# Patient Record
Sex: MALE | Race: BLACK OR AFRICAN AMERICAN | NOT HISPANIC OR LATINO | Employment: FULL TIME | URBAN - METROPOLITAN AREA
[De-identification: names, ages, dates, MRNs, and addresses within clinical notes are randomized per-mention and may not be internally consistent; named-entity substitution may affect disease eponyms.]

---

## 2023-11-14 ASSESSMENT — DERMATOLOGY QUALITY OF LIFE (QOL) ASSESSMENT
DATE THE QUALITY-OF-LIFE ASSESSMENT WAS COMPLETED: 66792
RATE HOW EMOTIONALLY BOTHERED YOU ARE BY YOUR SKIN PROBLEM (FOR EXAMPLE, WORRY, EMBARRASSMENT, FRUSTRATION): 0 - NEVER BOTHERED
RATE HOW BOTHERED YOU ARE BY SYMPTOMS OF YOUR SKIN PROBLEM (EG, ITCHING, STINGING BURNING, HURTING OR SKIN IRRITATION): 0 - NEVER BOTHERED
RATE HOW BOTHERED YOU ARE BY EFFECTS OF YOUR SKIN PROBLEMS ON YOUR ACTIVITIES (EG, GOING OUT, ACCOMPLISHING WHAT YOU WANT, WORK ACTIVITIES OR YOUR RELATIONSHIPS WITH OTHERS): 0 - NEVER BOTHERED
RATE HOW BOTHERED YOU ARE BY SYMPTOMS OF YOUR SKIN PROBLEM (EG, ITCHING, STINGING BURNING, HURTING OR SKIN IRRITATION): 0 - NEVER BOTHERED
WHAT SINGLE SKIN CONDITION LISTED BELOW IS THE PATIENT ANSWERING THE QUALITY-OF-LIFE ASSESSMENT QUESTIONS ABOUT: NONE OF THE ABOVE
RATE HOW EMOTIONALLY BOTHERED YOU ARE BY YOUR SKIN PROBLEM (FOR EXAMPLE, WORRY, EMBARRASSMENT, FRUSTRATION): 0 - NEVER BOTHERED
WHAT SINGLE SKIN CONDITION LISTED BELOW IS THE PATIENT ANSWERING THE QUALITY-OF-LIFE ASSESSMENT QUESTIONS ABOUT: NONE OF THE ABOVE
RATE HOW BOTHERED YOU ARE BY EFFECTS OF YOUR SKIN PROBLEMS ON YOUR ACTIVITIES (EG, GOING OUT, ACCOMPLISHING WHAT YOU WANT, WORK ACTIVITIES OR YOUR RELATIONSHIPS WITH OTHERS): 0 - NEVER BOTHERED

## 2023-11-14 ASSESSMENT — PATIENT GLOBAL ASSESSMENT (PGA): WHAT IS THE PGA: PATIENT GLOBAL ASSESSMENT:  1 - CLEAR

## 2023-11-17 ENCOUNTER — OFFICE VISIT (OUTPATIENT)
Dept: DERMATOLOGY | Facility: CLINIC | Age: 48
End: 2023-11-17
Payer: MEDICARE

## 2023-11-17 DIAGNOSIS — A63.0 ANOGENITAL (VENEREAL) WARTS: Primary | ICD-10-CM

## 2023-11-17 DIAGNOSIS — L30.4 INTERTRIGO: ICD-10-CM

## 2023-11-17 PROCEDURE — 54056 CRYOSURGERY PENIS LESION(S): CPT | Performed by: DERMATOLOGY

## 2023-11-17 PROCEDURE — 99214 OFFICE O/P EST MOD 30 MIN: CPT | Performed by: DERMATOLOGY

## 2023-11-17 PROCEDURE — 17110 DESTRUCTION B9 LES UP TO 14: CPT

## 2023-11-17 RX ORDER — ATORVASTATIN CALCIUM 80 MG/1
80 TABLET, FILM COATED ORAL NIGHTLY
COMMUNITY
End: 2024-03-04 | Stop reason: SDUPTHER

## 2023-11-17 RX ORDER — ALLOPURINOL 100 MG/1
400 TABLET ORAL DAILY
COMMUNITY
End: 2024-03-04 | Stop reason: SDUPTHER

## 2023-11-17 RX ORDER — AMLODIPINE BESYLATE 10 MG/1
10 TABLET ORAL DAILY
COMMUNITY
End: 2024-03-04 | Stop reason: SDUPTHER

## 2023-11-17 RX ORDER — ATENOLOL 50 MG/1
50 TABLET ORAL 2 TIMES DAILY
COMMUNITY
End: 2024-03-04 | Stop reason: SDUPTHER

## 2023-11-17 RX ORDER — KETOCONAZOLE 20 MG/G
CREAM TOPICAL 2 TIMES DAILY
Qty: 60 G | Refills: 11 | Status: SHIPPED | OUTPATIENT
Start: 2023-11-17

## 2023-11-17 NOTE — PATIENT INSTRUCTIONS
Thank you for visiting with  Dermatology today!    At your visit today, you had lesions treated with cryotherapy.    Going forward, we suggest the following:  - Ketoconazole cream twice a day for 2-3 weeks  - Maintenance in the area with Zeasorb AF powder

## 2023-11-17 NOTE — PROGRESS NOTES
Subjective     Wing Fontanez is a 48 y.o. male who presents for the following: Skin Tag (In right groin area).  He reports the lesion in his right groin area has been present for several months.  It sometimes itches, and he also notes red, irritated patches in his groin folds as well.    Review of Systems:  No other skin or systemic complaints other than what is documented elsewhere in the note.    The following portions of the chart were reviewed this encounter and updated as appropriate:       Skin Cancer History  No skin cancer on file.    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 and is now working as an [employer]     Allergies:  Aspirin and Ace inhibitors    Current Medications / CAM's:    Current Outpatient Medications:     allopurinol (Zyloprim) 100 mg tablet, Take 4 tablets (400 mg) by mouth once daily., Disp: , Rfl:     atenolol (Tenormin) 50 mg tablet, Take 1 tablet (50 mg) by mouth 2 times a day., Disp: , Rfl:     atorvastatin (Lipitor) 80 mg tablet, Take 1 tablet (80 mg) by mouth once daily at bedtime., Disp: , Rfl:     amLODIPine (Norvasc) 10 mg tablet, Take 1 tablet (10 mg) by mouth once daily., Disp: , Rfl:     ketoconazole (NIZOral) 2 % cream, Apply topically 2 times a day. For 2-3 weeks., Disp: 60 g, Rfl: 11     Objective   Well appearing patient in no apparent distress; mood and affect are within normal limits.    A skin examination was performed including: Face, neck, and bilateral inguinal folds and external genitalia. All findings within normal limits unless otherwise noted below.    Assessment/Plan   1. Anogenital (venereal) warts (2)  Right inguinal crease (2)  In his right inguinal fold (1.5 cm) and on his right  lateral scrotum (8 mm), there are 2 similar-appearing flesh- to light brown-colored, hyperkeratotic, flat-topped, verrucous plaque and papule, respectively    Genital Warts - right inguinal fold and right lateral scrotum.  The viral nature of genital warts, their potentially communicable nature via skin-to-skin contact and sexual transmission, and treatment options were discussed with the patient today.  After discussing the risks and benefits of various treatment options, the patient expressed understanding and wishes to undergo treatment with liquid nitrogen cryotherapy in the office today.  Should any of the warts not resolve within 2-3 weeks following treatment today or any new or similar lesions are noticed in the future, the patient was instructed to call our office to schedule a return visit for re-evaluation and possible repeat or further treatment if indicated at that time.  The patient expressed understanding, is in agreement with this plan, and wishes to proceed with cryotherapy today.    Destr of lesion - Right inguinal crease  Complexity: simple    Destruction method: cryotherapy    Informed consent: discussed and consent obtained    Lesion destroyed using liquid nitrogen: Yes    Cryotherapy cycles:  2  Outcome: patient tolerated procedure well with no complications    Post-procedure details: wound care instructions given      ketoconazole (NIZOral) 2 % cream - Right inguinal crease  Apply topically 2 times a day. For 2-3 weeks.    2. Intertrigo  Right Medial Thigh  In the patient's bilateral inguinal folds, there are red, moist, slightly scaly patches located in skin folds.    Intertrigo - flare in bilateral inguinal folds.  The potentially chronic and intermittently flaring nature of this condition, which likely involves colonization with Candidal yeast, and treatment options were discussed extensively with the patient today.  At this time, we recommend topical anti-fungal therapy with Ketoconazole 2%  cream, which the patient was instructed to apply twice daily to the affected areas for the next 2-3 weeks, followed by taper to weekends only with use of over-the-counter Zeasorb AF powder once daily for maintenance; the patient may repeat treatment in a similar fashion as needed for future flares.  The risks, benefits, and side effects of these medications were discussed.  The patient expressed understanding and is in agreement with this plan.      I saw and evaluated the patient. I personally obtained the key and critical portions of the history and physical exam or was physically present for key and critical portions performed by the resident/fellow. I reviewed the resident/fellow's documentation and discussed the patient with the resident/fellow. I agree with the resident/fellow's medical decision making as documented in the note.    Uche Smith MD

## 2024-03-04 ENCOUNTER — TELEPHONE (OUTPATIENT)
Dept: PRIMARY CARE | Facility: HOSPITAL | Age: 49
End: 2024-03-04
Payer: MEDICARE

## 2024-03-04 DIAGNOSIS — I15.8 OTHER SECONDARY HYPERTENSION: Primary | ICD-10-CM

## 2024-03-04 DIAGNOSIS — M10.00 IDIOPATHIC GOUT, UNSPECIFIED CHRONICITY, UNSPECIFIED SITE: ICD-10-CM

## 2024-03-04 DIAGNOSIS — E78.2 MIXED HYPERLIPIDEMIA: ICD-10-CM

## 2024-03-04 RX ORDER — AMLODIPINE BESYLATE 10 MG/1
10 TABLET ORAL DAILY
Qty: 30 TABLET | Refills: 3 | Status: SHIPPED | OUTPATIENT
Start: 2024-03-04 | End: 2024-04-01 | Stop reason: SDUPTHER

## 2024-03-04 RX ORDER — ALLOPURINOL 100 MG/1
400 TABLET ORAL DAILY
Qty: 30 TABLET | Refills: 3 | Status: SHIPPED | OUTPATIENT
Start: 2024-03-04 | End: 2024-04-01 | Stop reason: SDUPTHER

## 2024-03-04 RX ORDER — ATORVASTATIN CALCIUM 80 MG/1
80 TABLET, FILM COATED ORAL NIGHTLY
Qty: 30 TABLET | Refills: 3 | Status: SHIPPED | OUTPATIENT
Start: 2024-03-04 | End: 2024-04-01 | Stop reason: SDUPTHER

## 2024-03-04 RX ORDER — ATENOLOL 50 MG/1
50 TABLET ORAL 2 TIMES DAILY
Qty: 60 TABLET | Refills: 3 | Status: SHIPPED | OUTPATIENT
Start: 2024-03-04 | End: 2024-04-01 | Stop reason: SDUPTHER

## 2024-03-04 NOTE — TELEPHONE ENCOUNTER
Patient has an appt on 3/7/24 can we send RX for 7 days for    Allopurinol 100mg    Amlodopine 10mg    Atenolol 50mg    Atorvastatin 80mg    [facility] in [place]

## 2024-03-07 ENCOUNTER — APPOINTMENT (OUTPATIENT)
Dept: PRIMARY CARE | Facility: HOSPITAL | Age: 49
End: 2024-03-07
Payer: MEDICARE

## 2024-04-01 ENCOUNTER — TELEPHONE (OUTPATIENT)
Dept: PRIMARY CARE | Facility: HOSPITAL | Age: 49
End: 2024-04-01
Payer: MEDICARE

## 2024-04-01 DIAGNOSIS — E78.2 MIXED HYPERLIPIDEMIA: ICD-10-CM

## 2024-04-01 DIAGNOSIS — I15.8 OTHER SECONDARY HYPERTENSION: ICD-10-CM

## 2024-04-01 DIAGNOSIS — M10.00 IDIOPATHIC GOUT, UNSPECIFIED CHRONICITY, UNSPECIFIED SITE: ICD-10-CM

## 2024-04-01 RX ORDER — AMLODIPINE BESYLATE 10 MG/1
10 TABLET ORAL DAILY
Qty: 30 TABLET | Refills: 0 | Status: SHIPPED | OUTPATIENT
Start: 2024-04-01 | End: 2024-05-01

## 2024-04-01 RX ORDER — ATORVASTATIN CALCIUM 80 MG/1
80 TABLET, FILM COATED ORAL NIGHTLY
Qty: 30 TABLET | Refills: 0 | Status: SHIPPED | OUTPATIENT
Start: 2024-04-01 | End: 2024-05-01

## 2024-04-01 RX ORDER — ATENOLOL 50 MG/1
50 TABLET ORAL 2 TIMES DAILY
Qty: 60 TABLET | Refills: 0 | Status: SHIPPED | OUTPATIENT
Start: 2024-04-01 | End: 2024-05-01

## 2024-04-01 RX ORDER — ALLOPURINOL 100 MG/1
400 TABLET ORAL DAILY
Qty: 120 TABLET | Refills: 0 | Status: SHIPPED | OUTPATIENT
Start: 2024-04-01 | End: 2024-05-01

## 2024-04-01 NOTE — TELEPHONE ENCOUNTER
Received a box task regarding RX refills. Given that it has been atleast a year since we saw him last (med refills were last sent April 2023; but pt states that he was seen by us in September 2023) and he has an appt in 2 days with us, I called him back letting him know about his appt and that we could discuss his meds then. Pt states that he has a new job and so is unsure about his insurance and does not know if he can attend his appt with us on 4/3/24. I gave him our clinic # to call and discuss this insurance issue further with more details. I gave him a month's worth of refills on his allopurinol, amlodipine, atenolol, and atorvastatin to give him time to get this issue sorted.

## 2024-04-01 NOTE — TELEPHONE ENCOUNTER
Patient requesting refills on all medications.    Allopurinol  Amlodopine  Atenolol  Atorvastin    Health system pharmacy in [place]. Patient can be reached at [phone] patient states he was last seen 9/2023 unable to verify in the system

## 2024-04-03 ENCOUNTER — APPOINTMENT (OUTPATIENT)
Dept: PRIMARY CARE | Facility: HOSPITAL | Age: 49
End: 2024-04-03
Payer: MEDICARE

## 2024-06-24 DIAGNOSIS — I15.8 OTHER SECONDARY HYPERTENSION: ICD-10-CM

## 2024-06-26 ENCOUNTER — TELEPHONE (OUTPATIENT)
Dept: PRIMARY CARE | Facility: HOSPITAL | Age: 49
End: 2024-06-26
Payer: MEDICARE

## 2024-06-26 RX ORDER — ATENOLOL 50 MG/1
50 TABLET ORAL 2 TIMES DAILY
Qty: 60 TABLET | Refills: 0 | Status: SHIPPED | OUTPATIENT
Start: 2024-06-26 | End: 2024-07-26

## 2024-06-26 RX ORDER — AMLODIPINE BESYLATE 10 MG/1
10 TABLET ORAL DAILY
Qty: 30 TABLET | Refills: 0 | Status: SHIPPED | OUTPATIENT
Start: 2024-06-26 | End: 2024-07-26

## 2024-07-16 ENCOUNTER — OFFICE VISIT (OUTPATIENT)
Dept: PRIMARY CARE | Facility: HOSPITAL | Age: 49
End: 2024-07-16
Payer: MEDICARE

## 2024-07-16 ENCOUNTER — LAB (OUTPATIENT)
Dept: LAB | Facility: LAB | Age: 49
End: 2024-07-16
Payer: MEDICARE

## 2024-07-16 VITALS
HEIGHT: 66 IN | SYSTOLIC BLOOD PRESSURE: 119 MMHG | HEART RATE: 51 BPM | OXYGEN SATURATION: 97 % | TEMPERATURE: 97.9 F | BODY MASS INDEX: 32.14 KG/M2 | DIASTOLIC BLOOD PRESSURE: 74 MMHG | WEIGHT: 200 LBS

## 2024-07-16 DIAGNOSIS — E78.2 MIXED HYPERLIPIDEMIA: ICD-10-CM

## 2024-07-16 DIAGNOSIS — R73.03 PRE-DIABETES: ICD-10-CM

## 2024-07-16 DIAGNOSIS — F17.200 TOBACCO USE DISORDER: ICD-10-CM

## 2024-07-16 DIAGNOSIS — E78.00 PURE HYPERCHOLESTEROLEMIA: ICD-10-CM

## 2024-07-16 DIAGNOSIS — M67.40 GANGLION CYST: ICD-10-CM

## 2024-07-16 DIAGNOSIS — I15.8 OTHER SECONDARY HYPERTENSION: ICD-10-CM

## 2024-07-16 DIAGNOSIS — Z00.00 HEALTHCARE MAINTENANCE: Primary | ICD-10-CM

## 2024-07-16 DIAGNOSIS — M10.00 IDIOPATHIC GOUT, UNSPECIFIED CHRONICITY, UNSPECIFIED SITE: ICD-10-CM

## 2024-07-16 LAB
CHOLEST SERPL-MCNC: 284 MG/DL (ref 0–199)
CHOLESTEROL/HDL RATIO: 5.1
EST. AVERAGE GLUCOSE BLD GHB EST-MCNC: 117 MG/DL
HBA1C MFR BLD: 5.7 %
HDLC SERPL-MCNC: 55.9 MG/DL
LDLC SERPL CALC-MCNC: ABNORMAL MG/DL
NON HDL CHOLESTEROL: 228 MG/DL (ref 0–149)
TRIGL SERPL-MCNC: 432 MG/DL (ref 0–149)
VLDL: ABNORMAL

## 2024-07-16 PROCEDURE — 3078F DIAST BP <80 MM HG: CPT

## 2024-07-16 PROCEDURE — 99396 PREV VISIT EST AGE 40-64: CPT

## 2024-07-16 PROCEDURE — 83036 HEMOGLOBIN GLYCOSYLATED A1C: CPT

## 2024-07-16 PROCEDURE — 80061 LIPID PANEL: CPT

## 2024-07-16 PROCEDURE — 36415 COLL VENOUS BLD VENIPUNCTURE: CPT

## 2024-07-16 PROCEDURE — 3074F SYST BP LT 130 MM HG: CPT

## 2024-07-16 PROCEDURE — 3008F BODY MASS INDEX DOCD: CPT

## 2024-07-16 RX ORDER — ATENOLOL 50 MG/1
50 TABLET ORAL 2 TIMES DAILY
Qty: 60 TABLET | Refills: 11 | Status: SHIPPED | OUTPATIENT
Start: 2024-07-16 | End: 2025-07-11

## 2024-07-16 RX ORDER — ALLOPURINOL 100 MG/1
200 TABLET ORAL DAILY
Qty: 30 TABLET | Refills: 11 | Status: SHIPPED | OUTPATIENT
Start: 2024-07-16

## 2024-07-16 RX ORDER — ATORVASTATIN CALCIUM 80 MG/1
80 TABLET, FILM COATED ORAL NIGHTLY
Qty: 30 TABLET | Refills: 11 | Status: SHIPPED | OUTPATIENT
Start: 2024-07-16 | End: 2025-07-11

## 2024-07-16 RX ORDER — AMLODIPINE BESYLATE 10 MG/1
10 TABLET ORAL DAILY
Qty: 30 TABLET | Refills: 11 | Status: SHIPPED | OUTPATIENT
Start: 2024-07-16 | End: 2025-07-11

## 2024-07-16 RX ORDER — VARENICLINE TARTRATE 1 MG/1
1 TABLET, FILM COATED ORAL DAILY
Qty: 60 TABLET | Refills: 2 | Status: SHIPPED | OUTPATIENT
Start: 2024-07-16 | End: 2025-03-13

## 2024-07-16 RX ORDER — ALLOPURINOL 100 MG/1
200 TABLET ORAL DAILY
COMMUNITY
End: 2024-07-16 | Stop reason: SDUPTHER

## 2024-07-16 ASSESSMENT — ENCOUNTER SYMPTOMS
OCCASIONAL FEELINGS OF UNSTEADINESS: 0
LOSS OF SENSATION IN FEET: 0
DEPRESSION: 0

## 2024-07-16 ASSESSMENT — PATIENT HEALTH QUESTIONNAIRE - PHQ9
2. FEELING DOWN, DEPRESSED OR HOPELESS: NOT AT ALL
SUM OF ALL RESPONSES TO PHQ9 QUESTIONS 1 AND 2: 0
1. LITTLE INTEREST OR PLEASURE IN DOING THINGS: NOT AT ALL

## 2024-07-16 ASSESSMENT — PAIN SCALES - GENERAL: PAINLEVEL: 0-NO PAIN

## 2024-07-16 NOTE — PROGRESS NOTES
Subjective   Patient ID: Wing Fontanez is a 49 y.o. male who presents for Annual Exam.  HPI    Wing Fontanez 49 yr M follow up 3 PM  11/2023 - saw derm for anogenital warts with cryotherapy applied and interigo with ketoconazole applied, resolved    Ganglion on L and R wrist, non painful, not larger no pain, no ROM issues  Dr Pineda Cage did Carpal tunnel procedure (hand), would like referral back to him    4/2023 - saw sleep med - on CPAP, interested in oral appliance due to frequent travel, going ok compliant    No recent gout flares, would like to continue on amlodipine    Last seen [facility] [place], [place] 12/2022 - quit smoking for a year  No family hx colon cancer, would like to go for screening no Fhx colon cancer    Smoking here and there, smokes a pack every 4 to 5, wants to quit, was on chantix, off for couple, wants Chantix    Interested in weight loss, wants to change habits, likes greens, limited fast food    Meds: atorvastatin 80, amlodipine 10 mg, atenolol 50 mg, allopurinol 400 mg every day  Ran out of atorvastatin          Review of Systems  Otherwise neg except as mentioned above    Past Medical History:   Diagnosis Date    Other specified health status     No pertinent past medical history    Personal history of other diseases of urinary system 08/22/2017    History of chronic kidney disease     No past surgical history on file.  No family history on file.  Medication Documentation Review Audit       Reviewed by Maira Villavicencio MA (Medical Assistant) on 07/16/24 at 1526      Medication Order Taking? Sig Documenting Provider Last Dose Status   amLODIPine (Norvasc) 10 mg tablet [id number]  Take 1 tablet (10 mg) by mouth once daily. Benny Marquis MD MPH  Active   atenolol (Tenormin) 50 mg tablet [id number]  Take 1 tablet (50 mg) by mouth 2 times a day. Benny Marquis MD MPH  Active   atorvastatin (Lipitor) 80 mg tablet [id number]  Take 1 tablet (80 mg) by mouth once daily at bedtime. Harika Casarez MD   Active   ketoconazole (NIZOral) 2 % cream [id number]  Apply topically 2 times a day. For 2-3 weeks. Suly Perry MD  Active                  Allergies   Allergen Reactions    Aspirin Anaphylaxis and Other    Ace Inhibitors Angioedema     Reaction Date:Approx 19Feb2016     Social Determinants of Health     Tobacco Use: High Risk (7/16/2024)    Patient History     Smoking Tobacco Use: Some Days     Smokeless Tobacco Use: Never     Passive Exposure: Not on file   Alcohol Use: Not on file   Financial Resource Strain: Not on file   Food Insecurity: Not on file   Transportation Needs: Not on file   Physical Activity: Not on file   Stress: Not on file   Social Connections: Not on file   Intimate Partner Violence: Not on file   Depression: Not at risk (7/16/2024)    PHQ-2     PHQ-2 Score: 0   Housing Stability: Not on file   Utilities: Not on file   Digital Equity: Not on file   Health Literacy: Not on file       Objective   Visit Vitals  /74   Pulse 51   Temp 36.6 °C (97.9 °F) (Temporal)      Physical Exam      Gen: NAD, alert and oriented, resting comfortably on exam table  HEENT: PERRL, EOMI, no oral lesions, MMM, TM intact b/l  Neck: supple, no masses  Cardiac: RRR. No murmurs, rubs, or gallops  Resp: CTA b/l, no rales or ronchi  Abd: soft, nontender to palpation, no HSM, normal bowel sounds  MSK: Strength and ROM grossly intact b/l, b/l wrist ganglion cyst 2 cm  Neuro: CN 2-12 intact b/l, sensation to light touch intact  Skin: No rash or bruising  Lymph: No edema  Vascular: Radial and pedal pulses 2+ b/l  Psych: Normal mood and affect    No results found for this or any previous visit (from the past 24 hour(s)).       Assessment/Plan            Mr. Fontanez is a 50 y/o man with PMH of HTN, gout, obesity, and tobacco use disorder who presents today for routine check up. Overall doing well, will refill meds and address health maintenance issues. Labs today lipid and A1c.     Active  issues:  #HLD  #Hypertriglyceridemia  - counseled patient on elevated triglycerides and effect of fatty foods; encouraged to cut back on fatty foods  -last lipids 3/2022 - T chol 170, HDL 48, LDL 86,   -Recheck ordered, restarted home atorvastatin 80 mg every day (had been off since 2023     #HTN  - Continue on amlodipine 10 mg, atenolol 50 mg daily. (hx of angioedema w/ ACE-I, MAYO w/ thiazide)  - Check BP QD using home BP cuff  - Daily exercise, DASH diet encouraged     #Pre-diabetes  -HbA1c 5.8 (3/2022)  -Continue diet and exercise control  -Recheck ordered, no current meds    #B/l wrist ganglion cyst  -Currently asymptomatic, referral to Ortho hand for evaluation for possible aspiration vs conservative management.      #Gout  -Uric aid: 7.7 (3/2022)  -Cont allopurinol 200mg every day    #Obesity  -BMI 32, counseled on lifestyle changes     #Sleep Apnea  - Followed by Sleep Med, last seen 8/2020  - On CPAP machine     #Tobacco Use Disorder  - Smoking cessation discussed and encouraged  - Patient was able to quit for a whole year but now is smoking again and wants to quit  -restarting Chantix:  Days 1 to 3: 0.5 mg once daily.  Days 4 to 7: 0.5 mg twice daily.  Maintenance (day 8 and later): 1 mg twice daily  -tobacco counseling ordered    #Health maintenance   - HIV: negative in 2011  - Colonoscopy ordered today  - Pneumovax PPV23 5/2017, PCV 11/2023, Tdap 1/2020, COVID x2 (5/2021)     Staffed with: Dr. Torrez  Follow up: 3 months    Signed,  Latoya York MD PGY-3  Internal Medicine-Pediatrics

## 2024-07-16 NOTE — PATIENT INSTRUCTIONS
Dear  Mr Fontanez,    Thank you for choosing [facility] for your care needs. It was a pleasure to serve you. Today, we discussed the following:    Cholesterol and diabetes screening today    Ordered colonoscopy for colon cancer svcreening    [facility]  New prescription for Chantix to help with smoking cessation. Tobacco counseling    Days 1 to 3: 0.5 mg once daily.    Days 4 to 7: 0.5 mg twice daily.    Maintenance (day 8 and later): 1 mg twice daily      Orthopedics Dr Pineda Cage for ganglion cyst      Referrals:  you were referred to see the following specialists: orthopedics You should receive a call from central scheduling in the next few days if you do not receive a call within 3-5 business days please call [phone] to schedule your appointment. You can also schedule on the [employer] shanice for smartphones.     Health maintenance: up to date on pneumonia shot      Follow up:      Thank you so much for coming to the clinic today. It was very nice to meet you. If you have any questions please call our office at [phone] to talk to one of our physicians or schedule an appointment. Our fax number is [phone]. If your issue cannot wait until the next business day, please go to urgent care or the emergency department.     Latoya York MD  Internal Medicine/Pediatrics, PGY-2 resident physician      If You smoke or use other tobacco products, take steps to quit. Call [phone] for more information or to set up an appointment with  Tobacco Treatment & Counseling Program. The [place] Tobacco Quit Line is a free resource for people who don’t have insurance, receive Medicaid, pregnant women, or members of the [place] Tobacco Collaborative. Call 1-318-QUIT-NOW or 1-367.286.7947.    I also strongly urge all of my patients to register for [employer] by going to: [email or web address]  (The  staff can also send you a text/email link to register when you check out).

## 2024-07-17 NOTE — PROGRESS NOTES
I reviewed the resident/fellow's documentation and discussed the patient with the resident/fellow. I agree with the resident/fellow's medical decision making as documented in the note.    ATTENDING TEMPLATE [facility]  NAME: Erich Fontanez here for followup.  He is motivated to quit smoking again.  Wants Chantix.    HPI:  49 year old male   PMX:  HTN, tobacco use disorder prediabetic,  ROMEL  MEDS:  amlodipine, atenolol, atorvastatin ran out, allopurinol  Allergies:  angioedema on ACE, MAYO on thiazide  SOC HX: +tobacco  FAM HX:    PE:  BMI=32,  bilateral cysts on his wrists,    RECOMMEND:  1.  Tobacco cessation counseling and Chantix  2.  BP good today  3.  Encourage healthy habits  4.  Screening and prevention addressed    Carmen Torrez MD

## 2024-07-20 ENCOUNTER — TELEPHONE (OUTPATIENT)
Dept: INTERNAL MEDICINE | Facility: HOSPITAL | Age: 49
End: 2024-07-20
Payer: MEDICARE

## 2024-07-20 DIAGNOSIS — E78.1 HYPERTRIGLYCERIDEMIA: Primary | ICD-10-CM

## 2024-07-20 NOTE — TELEPHONE ENCOUNTER
Labs came back for patient A1c level improving by 0.1%, still prediabetic. On lipid, nonfasting triglyceride >400 precluding LDL calculation. Patient has been off Atorvastatin and recently resumed. Will order fasting lipid panel to assess LDL and TGY accurately. Called patient and no answer so will send my chart message    Signed,  Latoya York MD PGY-2  Internal Medicine-Pediatrics

## 2024-10-09 ENCOUNTER — HOSPITAL ENCOUNTER (OUTPATIENT)
Dept: GASTROENTEROLOGY | Facility: HOSPITAL | Age: 49
Setting detail: OUTPATIENT SURGERY
Discharge: HOME | End: 2024-10-09
Payer: MEDICARE

## 2024-10-09 VITALS
RESPIRATION RATE: 16 BRPM | DIASTOLIC BLOOD PRESSURE: 73 MMHG | SYSTOLIC BLOOD PRESSURE: 114 MMHG | HEART RATE: 54 BPM | TEMPERATURE: 98.2 F | OXYGEN SATURATION: 97 %

## 2024-10-09 DIAGNOSIS — Z00.00 HEALTHCARE MAINTENANCE: ICD-10-CM

## 2024-10-09 PROCEDURE — 45378 DIAGNOSTIC COLONOSCOPY: CPT | Performed by: STUDENT IN AN ORGANIZED HEALTH CARE EDUCATION/TRAINING PROGRAM

## 2024-10-09 PROCEDURE — 7100000009 HC PHASE TWO TIME - INITIAL BASE CHARGE

## 2024-10-09 PROCEDURE — 2500000004 HC RX 250 GENERAL PHARMACY W/ HCPCS (ALT 636 FOR OP/ED): Performed by: STUDENT IN AN ORGANIZED HEALTH CARE EDUCATION/TRAINING PROGRAM

## 2024-10-09 PROCEDURE — 99152 MOD SED SAME PHYS/QHP 5/>YRS: CPT | Performed by: STUDENT IN AN ORGANIZED HEALTH CARE EDUCATION/TRAINING PROGRAM

## 2024-10-09 PROCEDURE — 7100000010 HC PHASE TWO TIME - EACH INCREMENTAL 1 MINUTE

## 2024-10-09 PROCEDURE — 3700000012 HC SEDATION LEVEL 5+ TIME - INITIAL 15 MINUTES 5/> YEARS

## 2024-10-09 PROCEDURE — G0121 COLON CA SCRN NOT HI RSK IND: HCPCS | Performed by: STUDENT IN AN ORGANIZED HEALTH CARE EDUCATION/TRAINING PROGRAM

## 2024-10-09 RX ORDER — FENTANYL CITRATE 50 UG/ML
INJECTION, SOLUTION INTRAMUSCULAR; INTRAVENOUS AS NEEDED
Status: COMPLETED | OUTPATIENT
Start: 2024-10-09 | End: 2024-10-09

## 2024-10-09 RX ORDER — MIDAZOLAM HYDROCHLORIDE 1 MG/ML
INJECTION, SOLUTION INTRAMUSCULAR; INTRAVENOUS AS NEEDED
Status: COMPLETED | OUTPATIENT
Start: 2024-10-09 | End: 2024-10-09

## 2024-10-09 ASSESSMENT — PAIN SCALES - GENERAL
PAINLEVEL_OUTOF10: 0 - NO PAIN

## 2024-10-09 ASSESSMENT — PAIN - FUNCTIONAL ASSESSMENT
PAIN_FUNCTIONAL_ASSESSMENT: 0-10

## 2024-10-09 NOTE — H&P
History Of Present Illness  Wing Fontanez is a 49 y.o. male presenting for his screening colonoscopy     Past Medical History  Past Medical History:   Diagnosis Date    Other specified health status     No pertinent past medical history    Personal history of other diseases of urinary system 08/22/2017    History of chronic kidney disease     Surgical History  No past surgical history on file.  Social History  He reports that he has been smoking cigars. He has never used smokeless tobacco. He reports current alcohol use. He reports that he does not use drugs.    Family History  No family history on file.     Allergies  Allergies   Allergen Reactions    Aspirin Anaphylaxis and Other    Ace Inhibitors Angioedema     Reaction Date:Approx 19Feb2016     Review of Systems   All other systems reviewed and are negative.    Pre-sedation Evaluation:  ASA Classification - ASA 3 - Patient with moderate systemic disease with functional limitations  Mallampati Score - II (hard and soft palate, upper portion of tonsils and uvula visible)    Physical Exam  Vitals reviewed.   Eyes:      General: No scleral icterus.     Pupils: Pupils are equal, round, and reactive to light.   Cardiovascular:      Rate and Rhythm: Normal rate and regular rhythm.      Pulses: Normal pulses.   Pulmonary:      Effort: Pulmonary effort is normal.   Abdominal:      General: Abdomen is flat. There is no distension.      Palpations: Abdomen is soft.      Tenderness: There is no abdominal tenderness.   Skin:     General: Skin is warm.      Capillary Refill: Capillary refill takes less than 2 seconds.   Neurological:      General: No focal deficit present.      Mental Status: He is alert and oriented to person, place, and time.   Psychiatric:         Mood and Affect: Mood normal.         Thought Content: Thought content normal.         Judgment: Judgment normal.          Last Recorded Vitals  There were no vitals taken for this visit.     Assessment/Plan    Will proceed with screening colonoscopy      PTA/Current Medications:  (Not in a hospital admission)    Current Outpatient Medications   Medication Sig Dispense Refill    allopurinol (Zyloprim) 100 mg tablet Take 2 tablets (200 mg) by mouth once daily. 30 tablet 11    amLODIPine (Norvasc) 10 mg tablet Take 1 tablet (10 mg) by mouth once daily. 30 tablet 11    atenolol (Tenormin) 50 mg tablet Take 1 tablet (50 mg) by mouth 2 times a day. 60 tablet 11    atorvastatin (Lipitor) 80 mg tablet Take 1 tablet (80 mg) by mouth once daily at bedtime. 30 tablet 11    varenicline (Chantix) 1 mg tablet Take 1 tablet (1 mg) by mouth once daily. Take with full glass of water. Days 1 to 3: 0.5 mg once daily. Days 4 to 7: 0.5 mg twice daily.Maintenance (day 8 and later): 1 mg twice daily 60 tablet 2     No current facility-administered medications for this encounter.     Abebe Garcia MD

## 2024-10-18 DIAGNOSIS — F17.200 TOBACCO USE DISORDER: ICD-10-CM

## 2024-10-18 RX ORDER — VARENICLINE TARTRATE 1 MG/1
1 TABLET, FILM COATED ORAL DAILY
Qty: 60 TABLET | Refills: 2 | Status: SHIPPED | OUTPATIENT
Start: 2024-10-18 | End: 2025-06-15

## 2024-10-23 ENCOUNTER — TELEPHONE (OUTPATIENT)
Dept: PEDIATRICS | Facility: CLINIC | Age: 49
End: 2024-10-23
Payer: MEDICARE

## 2024-10-23 NOTE — TELEPHONE ENCOUNTER
Colonoscopy came back. No polyps  Repeat 10 years    Called pt and went to voicemail so left HIPAA compliant message.    Sent [employer] message with results and to encourage to schedule follow up    Signed,  Latoya York MD PGY-3  Internal Medicine-Pediatrics

## 2024-10-28 ENCOUNTER — APPOINTMENT (OUTPATIENT)
Dept: PRIMARY CARE | Facility: HOSPITAL | Age: 49
End: 2024-10-28
Payer: MEDICARE

## 2024-10-28 ENCOUNTER — TELEPHONE (OUTPATIENT)
Dept: ENDOCRINOLOGY | Facility: HOSPITAL | Age: 49
End: 2024-10-28
Payer: MEDICARE

## 2024-11-05 ENCOUNTER — DOCUMENTATION (OUTPATIENT)
Dept: PRIMARY CARE | Facility: HOSPITAL | Age: 49
End: 2024-11-05
Payer: MEDICARE

## 2024-11-05 ENCOUNTER — OFFICE VISIT (OUTPATIENT)
Dept: PRIMARY CARE | Facility: HOSPITAL | Age: 49
End: 2024-11-05
Payer: MEDICARE

## 2024-11-05 VITALS
DIASTOLIC BLOOD PRESSURE: 80 MMHG | TEMPERATURE: 98.2 F | HEIGHT: 66 IN | OXYGEN SATURATION: 97 % | HEART RATE: 55 BPM | SYSTOLIC BLOOD PRESSURE: 135 MMHG | WEIGHT: 197 LBS | BODY MASS INDEX: 31.66 KG/M2

## 2024-11-05 DIAGNOSIS — I10 PRIMARY HYPERTENSION: ICD-10-CM

## 2024-11-05 DIAGNOSIS — E78.2 MIXED HYPERLIPIDEMIA: ICD-10-CM

## 2024-11-05 DIAGNOSIS — F17.200 TOBACCO USE DISORDER: ICD-10-CM

## 2024-11-05 DIAGNOSIS — R73.03 PREDIABETES: Primary | ICD-10-CM

## 2024-11-05 DIAGNOSIS — M10.00 IDIOPATHIC GOUT, UNSPECIFIED CHRONICITY, UNSPECIFIED SITE: ICD-10-CM

## 2024-11-05 DIAGNOSIS — I15.8 OTHER SECONDARY HYPERTENSION: ICD-10-CM

## 2024-11-05 LAB
ALBUMIN SERPL BCP-MCNC: 4.9 G/DL (ref 3.4–5)
ANION GAP SERPL CALC-SCNC: 14 MMOL/L (ref 10–20)
BUN SERPL-MCNC: 12 MG/DL (ref 6–23)
CALCIUM SERPL-MCNC: 9.7 MG/DL (ref 8.6–10.6)
CHLORIDE SERPL-SCNC: 104 MMOL/L (ref 98–107)
CHOLEST SERPL-MCNC: 151 MG/DL (ref 0–199)
CHOLESTEROL/HDL RATIO: 3.2
CO2 SERPL-SCNC: 25 MMOL/L (ref 21–32)
CREAT SERPL-MCNC: 0.94 MG/DL (ref 0.5–1.3)
EGFRCR SERPLBLD CKD-EPI 2021: >90 ML/MIN/1.73M*2
EST. AVERAGE GLUCOSE BLD GHB EST-MCNC: 128 MG/DL
GLUCOSE SERPL-MCNC: 95 MG/DL (ref 74–99)
HBA1C MFR BLD: 6.1 %
HDLC SERPL-MCNC: 47.4 MG/DL
LDLC SERPL CALC-MCNC: 54 MG/DL
NON HDL CHOLESTEROL: 104 MG/DL (ref 0–149)
PHOSPHATE SERPL-MCNC: 3.6 MG/DL (ref 2.5–4.9)
POTASSIUM SERPL-SCNC: 6.1 MMOL/L (ref 3.5–5.3)
SODIUM SERPL-SCNC: 137 MMOL/L (ref 136–145)
TRIGL SERPL-MCNC: 249 MG/DL (ref 0–149)
VLDL: 50 MG/DL (ref 0–40)

## 2024-11-05 PROCEDURE — 99212 OFFICE O/P EST SF 10 MIN: CPT

## 2024-11-05 PROCEDURE — 99212 OFFICE O/P EST SF 10 MIN: CPT | Mod: GC

## 2024-11-05 PROCEDURE — 99000 SPECIMEN HANDLING OFFICE-LAB: CPT

## 2024-11-05 PROCEDURE — 80069 RENAL FUNCTION PANEL: CPT

## 2024-11-05 PROCEDURE — 3075F SYST BP GE 130 - 139MM HG: CPT

## 2024-11-05 PROCEDURE — 3008F BODY MASS INDEX DOCD: CPT

## 2024-11-05 PROCEDURE — 3079F DIAST BP 80-89 MM HG: CPT

## 2024-11-05 PROCEDURE — 80061 LIPID PANEL: CPT

## 2024-11-05 PROCEDURE — 83036 HEMOGLOBIN GLYCOSYLATED A1C: CPT

## 2024-11-05 PROCEDURE — 36415 COLL VENOUS BLD VENIPUNCTURE: CPT

## 2024-11-05 PROCEDURE — 90656 IIV3 VACC NO PRSV 0.5 ML IM: CPT | Mod: GC

## 2024-11-05 RX ORDER — AMLODIPINE BESYLATE 10 MG/1
10 TABLET ORAL DAILY
Qty: 30 TABLET | Refills: 11 | Status: SHIPPED | OUTPATIENT
Start: 2024-11-05 | End: 2025-10-31

## 2024-11-05 RX ORDER — VARENICLINE TARTRATE 1 MG/1
1 TABLET, FILM COATED ORAL 2 TIMES DAILY
Qty: 180 TABLET | Refills: 2 | Status: SHIPPED | OUTPATIENT
Start: 2024-11-05 | End: 2025-08-02

## 2024-11-05 RX ORDER — ATORVASTATIN CALCIUM 80 MG/1
80 TABLET, FILM COATED ORAL NIGHTLY
Qty: 30 TABLET | Refills: 11 | Status: SHIPPED | OUTPATIENT
Start: 2024-11-05 | End: 2025-10-31

## 2024-11-05 RX ORDER — ALLOPURINOL 100 MG/1
200 TABLET ORAL DAILY
Qty: 30 TABLET | Refills: 11 | Status: SHIPPED | OUTPATIENT
Start: 2024-11-05

## 2024-11-05 RX ORDER — ATENOLOL 50 MG/1
50 TABLET ORAL 2 TIMES DAILY
Qty: 60 TABLET | Refills: 11 | Status: SHIPPED | OUTPATIENT
Start: 2024-11-05 | End: 2025-10-31

## 2024-11-05 ASSESSMENT — PAIN SCALES - GENERAL: PAINLEVEL_OUTOF10: 0-NO PAIN

## 2024-11-05 ASSESSMENT — PATIENT HEALTH QUESTIONNAIRE - PHQ9
2. FEELING DOWN, DEPRESSED OR HOPELESS: NOT AT ALL
1. LITTLE INTEREST OR PLEASURE IN DOING THINGS: NOT AT ALL
SUM OF ALL RESPONSES TO PHQ9 QUESTIONS 1 AND 2: 0

## 2024-11-05 ASSESSMENT — ENCOUNTER SYMPTOMS
LOSS OF SENSATION IN FEET: 0
DEPRESSION: 0
OCCASIONAL FEELINGS OF UNSTEADINESS: 0

## 2024-11-05 NOTE — PROGRESS NOTES
I saw and evaluated the patient. I personally obtained the key and critical portions of the history and physical exam or was physically present for key and critical portions performed by the resident/fellow. I reviewed the resident/fellow's documentation and discussed the patient with the resident/fellow. I agree with the resident/fellow's medical decision making as documented in the note.    Rachel Marsahll MD

## 2024-11-05 NOTE — PATIENT INSTRUCTIONS
Dear  Mr Fontanez,    Thank you for choosing [facility] for your care needs. It was a pleasure to serve you. Today, we discussed the following:    Labs today for cholesterol, lipid screen, renal function, will call with results    Blood pressure - please take three times per week and send results to me on my chart and then we can decide if need to add additional med    Keep up good work with stopped smoking, Chantix refill maintenance dose!    Refills on meds    Follow up with sleep med for CPAP    Ozempic unlikely covered by insurance, handout given, will order test script to [facility] pharmacy once your labs result.      Health maintenance:  Please get your annual flu shot (done today), please gte covid shot from local pharmacy    Follow up: 6 month follow up    Thank you so much for coming to the clinic today. It was very nice to meet you. If you have any questions please call our office at [phone] to talk to one of our physicians or schedule an appointment. Our fax number is [phone]. If your issue cannot wait until the next business day, please go to urgent care or the emergency department.     Latoya York MD  Internal Medicine/Pediatrics, PGY-2 resident physician      If You smoke or use other tobacco products, take steps to quit. Call [phone] for more information or to set up an appointment with  Tobacco Treatment & Counseling Program. The [place] Tobacco Quit Line is a free resource for people who don’t have insurance, receive Medicaid, pregnant women, or members of the [place] Collabor[facility]. Call 0-789-QUIT-NOW or 1-978.614.3513.    I also strongly urge all of my patients to register for [facility]or[facility]art by going to: [email or web address]  (The  staff can also send you a text/email link to register when you check out).

## 2024-11-05 NOTE — PROGRESS NOTES
Advanced care planning discussed at this visit.  Patient does not currently have a Healthcare Power of  or Living Will in place. He does express that his friend Ora Hodges has his permission to act as his surrogate decision maker in the event of an emergency. Patient advised to bring in POA, Living Will and Advanced Care Plan for his chart if he  obtains one.  Patient was given information on POA and Living Will as well as documents to complete each when they wish.

## 2024-11-05 NOTE — PROGRESS NOTES
Subjective   Patient ID: Wing Fontanez is a 49 y.o. male who presents for Follow-up.  HPI    Wing Fontanez 49 yr M - follow up    Has been doing well  Wants diabetes recheck  Diet is better, not a lot of fast food, likes salads, occasional juice, rare pop, water  Exercise - gotten away from it, planning on restarting, did have [employer] membership, one day cardio/2 days working out  Interested in Ozempic for health benefits, has seen commericial on TV, OK giving self injection, no hx pancreatitis or FHX of MTC  Would like PDF on it  Weight - 90 kg at last visit to 89 kg today    Lipid panel TGY >400, needs fasting repeat   Will recalc ascvd once new lipid done  No FHX early heart disease before age 50    Colonoscopy - repeat 10 years,     No gout flares on allopurinol    Last chantix dose is today,  Has not smoked 3-4 months, would like to stay on maintenance chantix dose 1 mg, no cravings    BP - today 153/91, repeat - 135/80  Cuff - home values diastolic 80s, systolic 130s  When checks HR usually 60s, no dizziness,   On atenolol and amlodipine    Flu shot today, would like covid too      Review of Systems  Neg except as mentioned above    Past Medical History:   Diagnosis Date    Other specified health status     No pertinent past medical history    Personal history of other diseases of urinary system 08/22/2017    History of chronic kidney disease     No past surgical history on file.  No family history on file.  Medication Documentation Review Audit       Reviewed by Maira Villavicencio MA (Medical Assistant) on 11/05/24 at 1012      Medication Order Taking? Sig Documenting Provider Last Dose Status   allopurinol (Zyloprim) 100 mg tablet [id number] No Take 2 tablets (200 mg) by mouth once daily. Latoya York MD 10/9/2024 Active   amLODIPine (Norvasc) 10 mg tablet [id number] No Take 1 tablet (10 mg) by mouth once daily. Latoya York MD 10/9/2024 Active   atenolol (Tenormin) 50 mg tablet [id number] No Take 1  tablet (50 mg) by mouth 2 times a day. Latoya York MD 10/9/2024 Active   atorvastatin (Lipitor) 80 mg tablet [id number] No Take 1 tablet (80 mg) by mouth once daily at bedtime. Latoya York MD 10/8/2024 Active   varenicline (Chantix) 1 mg tablet [id number]  Take 1 tablet (1 mg) by mouth once daily. Take with full glass of water. Days 1 to 3: 0.5 mg once daily. Days 4 to 7: 0.5 mg twice daily.Maintenance (day 8 and later): 1 mg twice daily Ally Friedman MD  Active                  Allergies   Allergen Reactions    Aspirin Anaphylaxis and Other    Ace Inhibitors Angioedema     Reaction Date:Approx 19Feb2016     Social Drivers of Health     Tobacco Use: High Risk (11/5/2024)    Patient History     Smoking Tobacco Use: Some Days     Smokeless Tobacco Use: Never     Passive Exposure: Not on file   Alcohol Use: Not on file   Financial Resource Strain: Not on file   Food Insecurity: Not on file   Transportation Needs: Not on file   Physical Activity: Not on file   Stress: Not on file   Social Connections: Not on file   Intimate Partner Violence: Not on file   Depression: Not at risk (11/5/2024)    PHQ-2     PHQ-2 Score: 0   Housing Stability: Not on file   Utilities: Not on file   Digital Equity: Not on file   Health Literacy: Not on file       Objective   Visit Vitals  /80 (BP Location: Left arm)   Pulse 55   Temp 36.8 °C (98.2 °F) (Temporal)      Physical Exam    Gen: NAD, alert and oriented, resting comfortably on exam table  HEENT: PERRL, EOMI, no oral lesions, MMM, TM intact b/l  Neck: supple, no masses  Cardiac: RRR. No murmurs, rubs, or gallops  Resp: CTA b/l, no rales or ronchi  Abd: soft, nontender to palpation, no HSM, normal bowel sounds  MSK: Strength and ROM grossly intact b/l  Neuro: CN 2-12 intact b/l, sensation to light touch intact  Skin: No rash or bruising  Lymph: No edema  Vascular: Radial and pedal pulses 2+ b/l  Psych: Normal mood and affect    No results found for this or any previous visit  (from the past 24 hours).       Assessment/Plan          Mr. Fontanez is a 50 y/o man with PMH of HTN, DLD, gout, obesity, and tobacco use disorder who presents today for follow up. Overall doing well, successfully quit smoking on Chantix. Will order labs to eval prediabetes and dyslipidemia. Flu shot today, pt interested in Ozempic, will check labs to eval DMII status and then consider test script to [facility]. BP mildly above goal today,  but will ask pt to check BP at home and then send us the results via [employer] to see if there is a trend. Follow up 6 mo    #HLD   #Hypertriglyceridemia   - counseled patient on elevated triglycerides and effect of fatty foods; encouraged to cut back on fatty foods   -last lipids 3/2022 - T chol 170, HDL 48, LDL 86, ; recheck 7/2024 -  tchol 284, HDL 55.9,    LDL not calculable   -C/w home atorvastatin 80 mg every day   -Repeat fasting lipid    #HTN   - Continue on amlodipine 10 mg, atenolol 50 mg daily. (hx of angioedema w/ ACE-I, MAYO w/ thiazide)   - Check BP QD using home BP cuff, goal <120/80  - Daily exercise, DASH diet encouraged   -RFP recheck ordered  -HTN handout given    #Pre-diabetes   -HbA1c 5.7    -Continue diet and exercise control, repeat A1c  -Ozempic handout given    #B/l wrist ganglion cyst (improved)  -Currently asymptomatic, can consider referral to Ortho hand for evaluation for possible aspiration vs conservative management.      #Gout   -Uric aid: 7.7 (3/2022)   -Cont allopurinol 200mg every day     #Obesity   -BMI 31, counseled on lifestyle changes     #Sleep Apnea   -Followed by [facility], last seen 8/2020   -On CPAP machine, going well, thinks may need new machine, has apt coming up    #Tobacco Use Disorder   -Quit since Aug 2024, Maintenance Chantix 1 mg BID    #Health maintenance    - HIV: negative in 2011   - Colonoscopy completed 2024, no polyps, repeat 10 years   - Pneumovax PPV23 5/2017, PCV 11/2023, Tdap 1/2020, COVID x2 (5/2021), flu  (11/2024)   -A1c - 5.7% 7/2024   -Lipid - tchol 284, HDL 55.9,    non      Staffed with:  Dr Marshall    Follow up: 6 mo BP check and follow up    Signed,  Latoya York MD PGY-3  Internal Medicine-Pediatrics

## 2024-11-06 ENCOUNTER — TELEPHONE (OUTPATIENT)
Dept: INTENSIVE CARE | Facility: HOSPITAL | Age: 49
End: 2024-11-06
Payer: MEDICARE

## 2024-11-06 NOTE — TELEPHONE ENCOUNTER
Called pt and left HIPAA message regarding labs and sent [email or web address] message. RFP showed elevated K secondary to hemolysis. Otherwise stable. Lipids show good LDL control on statin. Pt A1c higher but remains prediabetic, counseled on diet and exercise.    Signed,  Latoya York MD PGY-3  Internal Medicine-Pediatrics

## 2024-11-07 DIAGNOSIS — I10 PRIMARY HYPERTENSION: ICD-10-CM

## 2024-11-07 DIAGNOSIS — R73.03 PREDIABETES: Primary | ICD-10-CM

## 2024-11-07 RX ORDER — SEMAGLUTIDE 0.68 MG/ML
0.25 INJECTION, SOLUTION SUBCUTANEOUS WEEKLY
Qty: 3 ML | Refills: 12 | Status: SHIPPED | OUTPATIENT
Start: 2024-11-07

## 2024-11-20 ENCOUNTER — APPOINTMENT (OUTPATIENT)
Dept: SLEEP MEDICINE | Facility: CLINIC | Age: 49
End: 2024-11-20
Payer: MEDICARE

## 2024-11-20 VITALS
BODY MASS INDEX: 31.34 KG/M2 | HEART RATE: 50 BPM | OXYGEN SATURATION: 97 % | WEIGHT: 195 LBS | DIASTOLIC BLOOD PRESSURE: 66 MMHG | HEIGHT: 66 IN | SYSTOLIC BLOOD PRESSURE: 120 MMHG

## 2024-11-20 DIAGNOSIS — I10 PRIMARY HYPERTENSION: ICD-10-CM

## 2024-11-20 DIAGNOSIS — G47.33 OSA (OBSTRUCTIVE SLEEP APNEA): Primary | ICD-10-CM

## 2024-11-20 PROCEDURE — 3008F BODY MASS INDEX DOCD: CPT | Performed by: PHYSICIAN ASSISTANT

## 2024-11-20 PROCEDURE — 3078F DIAST BP <80 MM HG: CPT | Performed by: PHYSICIAN ASSISTANT

## 2024-11-20 PROCEDURE — 3074F SYST BP LT 130 MM HG: CPT | Performed by: PHYSICIAN ASSISTANT

## 2024-11-20 PROCEDURE — 99213 OFFICE O/P EST LOW 20 MIN: CPT | Performed by: PHYSICIAN ASSISTANT

## 2024-11-20 ASSESSMENT — SLEEP AND FATIGUE QUESTIONNAIRES
HOW LIKELY ARE YOU TO NOD OFF OR FALL ASLEEP WHILE SITTING AND TALKING TO SOMEONE: WOULD NEVER DOZE
HOW LIKELY ARE YOU TO NOD OFF OR FALL ASLEEP WHILE LYING DOWN TO REST IN THE AFTERNOON WHEN CIRCUMSTANCES PERMIT: MODERATE CHANCE OF DOZING
HOW LIKELY ARE YOU TO NOD OFF OR FALL ASLEEP WHILE SITTING QUIETLY AFTER LUNCH WITHOUT ALCOHOL: WOULD NEVER DOZE
HOW LIKELY ARE YOU TO NOD OFF OR FALL ASLEEP IN A CAR, WHILE STOPPED FOR A FEW MINUTES IN TRAFFIC: WOULD NEVER DOZE
HOW LIKELY ARE YOU TO NOD OFF OR FALL ASLEEP WHILE SITTING AND READING: HIGH CHANCE OF DOZING
HOW LIKELY ARE YOU TO NOD OFF OR FALL ASLEEP WHEN YOU ARE A PASSENGER IN A CAR FOR AN HOUR WITHOUT A BREAK: WOULD NEVER DOZE
ESS-CHAD TOTAL SCORE: 5
SITING INACTIVE IN A PUBLIC PLACE LIKE A CLASS ROOM OR A MOVIE THEATER: WOULD NEVER DOZE
HOW LIKELY ARE YOU TO NOD OFF OR FALL ASLEEP WHILE WATCHING TV: WOULD NEVER DOZE

## 2024-11-20 ASSESSMENT — PAIN SCALES - GENERAL: PAINLEVEL_OUTOF10: 0-NO PAIN

## 2024-11-20 NOTE — PROGRESS NOTES
"[facility]  Followup Visit Note    HISTORY OF PRESENT ILLNESS   Wing Fontanez is a 49 y.o. male who presents to a [facility] for followup.       Current History    On today's visit, the patient reports he is doing very well with CPAP.  Denies any issues with pressure settings or humidity.  He finds mask comfortable.    With CPAP he does not feel as tired and he never wakes up at night.    PAP Adherence:      Sleep Scales:  ESS: 5     REVIEW OF SYSTEMS    All other systems negative      PHYSICAL EXAM     VITAL SIGNS: /66   Pulse 50   Ht 1.676 m (5' 6\")   Wt 88.5 kg (195 lb)   SpO2 97%   BMI 31.47 kg/m²      PREVIOUS WEIGHTS:  Wt Readings from Last 3 Encounters:   11/20/24 88.5 kg (195 lb)   11/05/24 89.4 kg (197 lb)   07/16/24 90.7 kg (200 lb)       Constitutional: Alert and oriented, cooperative, no obvious distress   HEENT: Non icteric or anemic, EOM WNL bilaterally   Neck: Supple, no JVD, no goiter, no adenopathy, no rigidity  Extremities: No clubbing, no LL edema   Neuromuscular: Cranial nerves grossly intact, no focal deficits     RESULTS/DATA     No results found for: \"IRON\", \"TRANSFERRIN\", \"IRONSAT\", \"TIBC\", \"FERRITIN\"      ASSESSMENT/PLAN     Mr. Fontanez is a 49 y.o. male and returns in followup for the following issues:    OBSTRUCTIVE SLEEP APNEA  -Benefiting from CPAP  -Tolerating mask, pressure, humidity well     HYPERTENSION  BP Readings from Last 3 Encounters:   11/20/24 120/66   11/05/24 135/80   10/09/24 114/73      -Generally controlled on current treatment    Follow up 1 year         "

## 2024-12-04 ENCOUNTER — TELEPHONE (OUTPATIENT)
Dept: SLEEP MEDICINE | Facility: CLINIC | Age: 49
End: 2024-12-04
Payer: MEDICARE

## 2024-12-04 NOTE — TELEPHONE ENCOUNTER
Patient called and said that yes he no longer wants to go through medical service company he would like his services transferred over to [facility]. He is requesting the order, demographics, notes and sleep study to be sent over to [facility].    [phone] fax number    [phone]

## 2025-01-24 ENCOUNTER — OFFICE VISIT (OUTPATIENT)
Dept: ORTHOPEDIC SURGERY | Facility: HOSPITAL | Age: 50
End: 2025-01-24
Payer: MEDICARE

## 2025-01-24 DIAGNOSIS — M67.431 GANGLION OF RIGHT WRIST: ICD-10-CM

## 2025-01-24 DIAGNOSIS — M67.432 GANGLION OF LEFT WRIST: Primary | ICD-10-CM

## 2025-01-24 PROCEDURE — 99214 OFFICE O/P EST MOD 30 MIN: CPT | Performed by: ORTHOPAEDIC SURGERY

## 2025-01-24 NOTE — PROGRESS NOTES
CHIEF COMPLAINT         Mass on hand    ASSESSMENT + PLAN     ***        HISTORY OF PRESENT ILLNESS       Patient is a 49 y.o. ***-hand dominant male ***, who presents today for evaluation of ***      REVIEW OF SYSTEMS       A 30-item multi-system Review Of Systems was obtained on today's intake form.  This was reviewed with the patient and is correct.  The pertinent positives and negatives are listed above.  The form has been scanned separately into the medical record.      PHYSICAL EXAM    Constitutional:    Appears stated age. Well-developed and well-nourished ***.  Psychiatric:         Pleasant normal mood and affect. Behavior is appropriate for the situation.   Head:                   Normocephalic and atraumatic.  Eyes:                    Pupils are equal and round.  Cardiovascular:  2+ radial and ulnar pulses. Fingers well-perfused.  Respiratory:        Effort normal. No respiratory distress. Speaking in complete sentences.  Neurologic:       Alert and oriented to person, place, and time.  Skin:                Skin is intact, warm and dry.  Hematologic / Lymphatic:    No lymphedema or lymphangitis.    Extremities / Musculoskeletal:                      ***      IMAGING / LABS / EMGs           ***      Past Medical History:   Diagnosis Date    Other specified health status     No pertinent past medical history    Personal history of other diseases of urinary system 08/22/2017    History of chronic kidney disease       Medication Documentation Review Audit       Reviewed by Jax Young PA-C (Physician Assistant) on 11/20/24 at 1500      Medication Order Taking? Sig Documenting Provider Last Dose Status   allopurinol (Zyloprim) 100 mg tablet [id number] Yes Take 2 tablets (200 mg) by mouth once daily. Latoya York MD  Active   amLODIPine (Norvasc) 10 mg tablet [id number] Yes Take 1 tablet (10 mg) by mouth once daily. Latoya York MD  Active   atenolol (Tenormin) 50 mg tablet [id number] Yes Take 1 tablet (50  mg) by mouth 2 times a day. Latoya York MD  Active   atorvastatin (Lipitor) 80 mg tablet [id number] Yes Take 1 tablet (80 mg) by mouth once daily at bedtime. Latoya York MD  Active   semaglutide (Ozempic) 0.25 mg or 0.5 mg (2 mg/3 mL) pen injector [id number] Yes Inject 0.25 mg under the skin 1 (one) time per week. Latoya York MD  Active   varenicline (Chantix) 1 mg tablet [id number] Yes Take 1 tablet (1 mg) by mouth 2 times a day. Take with full glass of water. Days 1 to 3: 0.5 mg once daily. Days 4 to 7: 0.5 mg twice daily.Maintenance (day 8 and later): 1 mg twice daily Latoya York MD  Active                    Allergies   Allergen Reactions    Aspirin Anaphylaxis and Other    Ace Inhibitors Angioedema     Reaction Date:Approx 19Feb2016       Social History     Socioeconomic History    Marital status: Single     Spouse name: Not on file    Number of children: Not on file    Years of education: Not on file    Highest education level: Not on file   Occupational History    Not on file   Tobacco Use    Smoking status: Some Days     Types: Cigars    Smokeless tobacco: Never   Substance and Sexual Activity    Alcohol use: Yes     Comment: social    Drug use: Never    Sexual activity: Yes     Partners: Female     Birth control/protection: Pill   Other Topics Concern    Not on file   Social History Narrative    Not on file     Social Drivers of Health     Financial Resource Strain: Not on file   Food Insecurity: Not on file   Transportation Needs: Not on file   Physical Activity: Not on file   Stress: Not on file   Social Connections: Not on file   Intimate Partner Violence: Not on file   Housing Stability: Not on file       No past surgical history on file.      Electronically Signed      ZOË Cage MD      Orthopaedic Hand Surgery      [phone]   (Physician Assistant) on 11/20/24 at 1500      Medication Order Taking? Sig Documenting Provider Last Dose Status   allopurinol (Zyloprim) 100 mg tablet [id number] Yes Take 2 tablets (200 mg) by mouth once daily. Latoya York MD  Active   amLODIPine (Norvasc) 10 mg tablet [id number] Yes Take 1 tablet (10 mg) by mouth once daily. Latoya York MD  Active   atenolol (Tenormin) 50 mg tablet [id number] Yes Take 1 tablet (50 mg) by mouth 2 times a day. Latoya York MD  Active   atorvastatin (Lipitor) 80 mg tablet [id number] Yes Take 1 tablet (80 mg) by mouth once daily at bedtime. Latoya York MD  Active   semaglutide (Ozempic) 0.25 mg or 0.5 mg (2 mg/3 mL) pen injector [id number] Yes Inject 0.25 mg under the skin 1 (one) time per week. Latoya York MD  Active   varenicline (Chantix) 1 mg tablet [id number] Yes Take 1 tablet (1 mg) by mouth 2 times a day. Take with full glass of water. Days 1 to 3: 0.5 mg once daily. Days 4 to 7: 0.5 mg twice daily.Maintenance (day 8 and later): 1 mg twice daily Latoya York MD  Active                    Allergies   Allergen Reactions    Aspirin Anaphylaxis and Other    Ace Inhibitors Angioedema     Reaction Date:Approx 19Feb2016       Social History     Socioeconomic History    Marital status: Single     Spouse name: Not on file    Number of children: Not on file    Years of education: Not on file    Highest education level: Not on file   Occupational History    Not on file   Tobacco Use    Smoking status: Some Days     Types: Cigars    Smokeless tobacco: Never   Substance and Sexual Activity    Alcohol use: Yes     Comment: social    Drug use: Never    Sexual activity: Yes     Partners: Female     Birth control/protection: Pill   Other Topics Concern    Not on file   Social History Narrative    Not on file     Social Drivers of Health     Financial Resource Strain: Not on file   Food Insecurity: Not on file   Transportation Needs: Not on file   Physical Activity: Not on file    Stress: Not on file   Social Connections: Not on file   Intimate Partner Violence: Not on file   Housing Stability: Not on file       No past surgical history on file.    SURGICAL INDICATION    I reviewed the options for further management of this condition and the likely success rates of each.  The patient feels that they have maximized the benefits of conservative care, and they do want to go on to surgery.    I reviewed the major risks of surgery including infection; scarring; damage to nerves, tendons, or vessels; stiffness; failure to relieve symptoms, recurrent symptoms, recurrent mass, and wound healing problems, as well as anesthesia risks.  I answered their questions to their satisfaction.  They were given my contact information and will contact the office when they are ready to schedule an exact surgical date.  Surgery will be posted as follows :    Dx :          Bilateral wrist ganglions  ICD-10 :      M65.431  and  M65.432  Procedure :     Bilateral radial wrist ganglion excisions  CPT :        (2x  18234  Anesth :    MAC  [facility] :   [facility]  Duration :    90 minutes  Specials :    None  PAT :       No  Post-Op Visit :    10-15 days      Electronically Signed      ZOË Cage MD      Orthopaedic Hand Surgery      [phone]

## 2025-01-24 NOTE — Clinical Note
January 27, 2025     Latoya York MD  [id number]  [facility] [address]    Patient: Wing Fontanez   YOB: 1975   Date of Visit: 1/24/2025       Dear Dr. Latoya York MD:    Thank you for referring Wing Fontanez to me for evaluation. Below are my notes for this consultation.  If you have questions, please do not hesitate to call me. I look forward to following your patient along with you.       Sincerely,     Pineda Cage MD      CC: No Recipients  ______________________________________________________________________________________

## 2025-01-24 NOTE — LETTER
with thumb forceful pinch.  No popping, clicking, or subjective instability.  No particular treatment so far.  No similar masses elsewhere.    He is not diabetic or hypothyroid.  He does not smoke.  He does have 2 beers a day.      REVIEW OF SYSTEMS    An updated 30-item multi-system Review Of Systems was obtained on today's intake form.  This was reviewed with the patient and is correct.  It has been scanned separately into the medical record.      PHYSICAL EXAM    Constitutional:    Appears stated age. Well-developed and well-nourished overweight male in no acute distress.  Psychiatric:         Pleasant normal mood and affect. Behavior is appropriate for the situation.   Head:                   Normocephalic and atraumatic.  Eyes:                    Pupils are equal and round.  Cardiovascular:  2+ radial and ulnar pulses. Fingers well-perfused.  Respiratory:        Effort normal. No respiratory distress. Speaking in complete sentences.  Neurologic:       Alert and oriented to person, place, and time.  Skin:                Skin is intact, warm and dry.  Hematologic / Lymphatic:    No lymphedema or lymphangitis.    Extremities / Musculoskeletal:                      Skin of both hands and wrists is intact with no erythema, ecchymosis, or generalized swelling.  Normal skin drag and coloration.  Full composite finger flexion extension with good intrinsic plus minus posture bilaterally.  Thumb opposition to station 9.  There is a 10 mm round subcutaneous mass at the right radial wrist just volar to the first compartment tendons.  Normal overlying skin.  Mass is nontender, nonpulsatile, with no Tinel's sign.  It does transilluminate.  On the left wrist there is a 6 mm round mass just dorsal to the first compartment tendons.  It does not clearly transilluminate, which is not surprising given the size and position.  Mild discomfort with Finkelstein on both sides but negative wrist flexion-thumb extension test bilaterally.   No CMC tenderness.  No triggering.  Symmetric wrist and forearm motion.  Negative Sinclair.  Negative midcarpal shift.  Sensation intact to light touch in all distributions.  Capillary refill less than 2 seconds.      IMAGING / LABS / EMGs           None pertinent      Past Medical History:   Diagnosis Date   • Other specified health status     No pertinent past medical history   • Personal history of other diseases of urinary system 08/22/2017    History of chronic kidney disease       Medication Documentation Review Audit       Reviewed by Jax Yougn PA-C (Physician Assistant) on 11/20/24 at 1500      Medication Order Taking? Sig Documenting Provider Last Dose Status   allopurinol (Zyloprim) 100 mg tablet [id number] Yes Take 2 tablets (200 mg) by mouth once daily. Latoya York MD  Active   amLODIPine (Norvasc) 10 mg tablet [id number] Yes Take 1 tablet (10 mg) by mouth once daily. Latoya York MD  Active   atenolol (Tenormin) 50 mg tablet [id number] Yes Take 1 tablet (50 mg) by mouth 2 times a day. Latoya York MD  Active   atorvastatin (Lipitor) 80 mg tablet [id number] Yes Take 1 tablet (80 mg) by mouth once daily at bedtime. Latoya York MD  Active   semaglutide (Ozempic) 0.25 mg or 0.5 mg (2 mg/3 mL) pen injector [id number] Yes Inject 0.25 mg under the skin 1 (one) time per week. Latoya York MD  Active   varenicline (Chantix) 1 mg tablet [id number] Yes Take 1 tablet (1 mg) by mouth 2 times a day. Take with full glass of water. Days 1 to 3: 0.5 mg once daily. Days 4 to 7: 0.5 mg twice daily.Maintenance (day 8 and later): 1 mg twice daily Latoya York MD  Active                    Allergies   Allergen Reactions   • Aspirin Anaphylaxis and Other   • Ace Inhibitors Angioedema     Reaction Date:Approx 19Feb2016       Social History     Socioeconomic History   • Marital status: Single     Spouse name: Not on file   • Number of children: Not on file   • Years of education: Not on file   • Highest education  level: Not on file   Occupational History   • Not on file   Tobacco Use   • Smoking status: Some Days     Types: Cigars   • Smokeless tobacco: Never   Substance and Sexual Activity   • Alcohol use: Yes     Comment: social   • Drug use: Never   • Sexual activity: Yes     Partners: Female     Birth control/protection: Pill   Other Topics Concern   • Not on file   Social History Narrative   • Not on file     Social Drivers of Health     Financial Resource Strain: Not on file   Food Insecurity: Not on file   Transportation Needs: Not on file   Physical Activity: Not on file   Stress: Not on file   Social Connections: Not on file   Intimate Partner Violence: Not on file   Housing Stability: Not on file       No past surgical history on file.    SURGICAL INDICATION    I reviewed the options for further management of this condition and the likely success rates of each.  The patient feels that they have maximized the benefits of conservative care, and they do want to go on to surgery.    I reviewed the major risks of surgery including infection; scarring; damage to nerves, tendons, or vessels; stiffness; failure to relieve symptoms, recurrent symptoms, recurrent mass, and wound healing problems, as well as anesthesia risks.  I answered their questions to their satisfaction.  They were given my contact information and will contact the office when they are ready to schedule an exact surgical date.  Surgery will be posted as follows :    Dx :          Bilateral wrist ganglions  ICD-10 :      M65.431  and  M65.432  Procedure :     Bilateral radial wrist ganglion excisions  CPT :        (2x)  04268  Anesth :    MAC  [facility] :   [facility]  Duration :    90 minutes  Specials :    None  PAT :       No  Post-Op Visit :    10-15 days      Electronically Signed      ZOË Cage MD      Orthopaedic Hand Surgery      [phone]

## 2025-01-24 NOTE — H&P (VIEW-ONLY)
CHIEF COMPLAINT         Mass on both wrists    ASSESSMENT + PLAN    Bilateral radial ganglion cyst    The benign nature of the ganglion and its waxing and waning size was reviewed, as was the expectation that any aching discomfort will gradually decrease over the next few months.  The options for management were reviewed, including observation, aspiration, or surgical excision, along with the likely success rates of each.    I stressed the social difficulties in completing ADLs following bilateral hand surgery.  Patient stated that he would have enough family support to manage.    The patient wished to proceed with surgery.  I reviewed the major risks and benefits of the procedure and answered their questions to their satisfaction.  The 5-10% recurrence rate after surgery was emphasized, and the patient voiced understanding. They will contact the office to schedule an exact surgical date, which will be done at the location of his convenience, under sedation and local.        HISTORY OF PRESENT ILLNESS       Patient is a 49 y.o. right-hand dominant male RTA , who presents today to and 1/2 years after last visit with me for evaluation of a new concern.  His previous carpal tunnel resolved with surgery.  He is now having some discomfort on the radial aspect of each wrist and has noticed a small mass on each side.  These are painful when bumped or with thumb forceful pinch.  No popping, clicking, or subjective instability.  No particular treatment so far.  No similar masses elsewhere.    He is not diabetic or hypothyroid.  He does not smoke.  He does have 2 beers a day.      REVIEW OF SYSTEMS    An updated 30-item multi-system Review Of Systems was obtained on today's intake form.  This was reviewed with the patient and is correct.  It has been scanned separately into the medical record.      PHYSICAL EXAM    Constitutional:    Appears stated age. Well-developed and well-nourished overweight male in no acute  distress.  Psychiatric:         Pleasant normal mood and affect. Behavior is appropriate for the situation.   Head:                   Normocephalic and atraumatic.  Eyes:                    Pupils are equal and round.  Cardiovascular:  2+ radial and ulnar pulses. Fingers well-perfused.  Respiratory:        Effort normal. No respiratory distress. Speaking in complete sentences.  Neurologic:       Alert and oriented to person, place, and time.  Skin:                Skin is intact, warm and dry.  Hematologic / Lymphatic:    No lymphedema or lymphangitis.    Extremities / Musculoskeletal:                      Skin of both hands and wrists is intact with no erythema, ecchymosis, or generalized swelling.  Normal skin drag and coloration.  Full composite finger flexion extension with good intrinsic plus minus posture bilaterally.  Thumb opposition to station 9.  There is a 10 mm round subcutaneous mass at the right radial wrist just volar to the first compartment tendons.  Normal overlying skin.  Mass is nontender, nonpulsatile, with no Tinel's sign.  It does transilluminate.  On the left wrist there is a 6 mm round mass just dorsal to the first compartment tendons.  It does not clearly transilluminate, which is not surprising given the size and position.  Mild discomfort with Finkelstein on both sides but negative wrist flexion-thumb extension test bilaterally.  No CMC tenderness.  No triggering.  Symmetric wrist and forearm motion.  Negative Sinclair.  Negative midcarpal shift.  Sensation intact to light touch in all distributions.  Capillary refill less than 2 seconds.      IMAGING / LABS / EMGs           None pertinent      Past Medical History:   Diagnosis Date    Other specified health status     No pertinent past medical history    Personal history of other diseases of urinary system 08/22/2017    History of chronic kidney disease       Medication Documentation Review Audit       Reviewed by Jax Young PA-C  (Physician Assistant) on 11/20/24 at 1500      Medication Order Taking? Sig Documenting Provider Last Dose Status   allopurinol (Zyloprim) 100 mg tablet [id number] Yes Take 2 tablets (200 mg) by mouth once daily. Latoya York MD  Active   amLODIPine (Norvasc) 10 mg tablet [id number] Yes Take 1 tablet (10 mg) by mouth once daily. Latoya York MD  Active   atenolol (Tenormin) 50 mg tablet [id number] Yes Take 1 tablet (50 mg) by mouth 2 times a day. Latoya York MD  Active   atorvastatin (Lipitor) 80 mg tablet [id number] Yes Take 1 tablet (80 mg) by mouth once daily at bedtime. Latoya York MD  Active   semaglutide (Ozempic) 0.25 mg or 0.5 mg (2 mg/3 mL) pen injector [id number] Yes Inject 0.25 mg under the skin 1 (one) time per week. Latoya York MD  Active   varenicline (Chantix) 1 mg tablet [id number] Yes Take 1 tablet (1 mg) by mouth 2 times a day. Take with full glass of water. Days 1 to 3: 0.5 mg once daily. Days 4 to 7: 0.5 mg twice daily.Maintenance (day 8 and later): 1 mg twice daily Latoya York MD  Active                    Allergies   Allergen Reactions    Aspirin Anaphylaxis and Other    Ace Inhibitors Angioedema     Reaction Date:Approx 19Feb2016       Social History     Socioeconomic History    Marital status: Single     Spouse name: Not on file    Number of children: Not on file    Years of education: Not on file    Highest education level: Not on file   Occupational History    Not on file   Tobacco Use    Smoking status: Some Days     Types: Cigars    Smokeless tobacco: Never   Substance and Sexual Activity    Alcohol use: Yes     Comment: social    Drug use: Never    Sexual activity: Yes     Partners: Female     Birth control/protection: Pill   Other Topics Concern    Not on file   Social History Narrative    Not on file     Social Drivers of Health     Financial Resource Strain: Not on file   Food Insecurity: Not on file   Transportation Needs: Not on file   Physical Activity: Not on file    Stress: Not on file   Social Connections: Not on file   Intimate Partner Violence: Not on file   Housing Stability: Not on file       No past surgical history on file.    SURGICAL INDICATION    I reviewed the options for further management of this condition and the likely success rates of each.  The patient feels that they have maximized the benefits of conservative care, and they do want to go on to surgery.    I reviewed the major risks of surgery including infection; scarring; damage to nerves, tendons, or vessels; stiffness; failure to relieve symptoms, recurrent symptoms, recurrent mass, and wound healing problems, as well as anesthesia risks.  I answered their questions to their satisfaction.  They were given my contact information and will contact the office when they are ready to schedule an exact surgical date.  Surgery will be posted as follows :    Dx :          Bilateral wrist ganglions  ICD-10 :      M65.431  and  M65.432  Procedure :     Bilateral radial wrist ganglion excisions  CPT :        (2x  17927  Anesth :    MAC  Location :   [facility]  Duration :    90 minutes  Specials :    None  PAT :       No  Post-Op Visit :    10-15 days      Electronically Signed      ZOË Cage MD      Orthopaedic Hand Surgery      [phone]

## 2025-01-31 DIAGNOSIS — M67.432 GANGLION OF LEFT WRIST: ICD-10-CM

## 2025-01-31 DIAGNOSIS — M67.431 GANGLION OF RIGHT WRIST: ICD-10-CM

## 2025-02-02 PROBLEM — M67.432 GANGLION OF LEFT WRIST: Status: ACTIVE | Noted: 2025-01-31

## 2025-02-02 PROBLEM — M67.431 GANGLION OF RIGHT WRIST: Status: ACTIVE | Noted: 2025-01-31

## 2025-02-10 ENCOUNTER — ANESTHESIA EVENT (OUTPATIENT)
Dept: OPERATING ROOM | Facility: CLINIC | Age: 50
End: 2025-02-10
Payer: MEDICARE

## 2025-02-13 ENCOUNTER — ANESTHESIA (OUTPATIENT)
Dept: OPERATING ROOM | Facility: CLINIC | Age: 50
End: 2025-02-13
Payer: MEDICARE

## 2025-02-13 ENCOUNTER — HOSPITAL ENCOUNTER (OUTPATIENT)
Facility: CLINIC | Age: 50
Setting detail: OUTPATIENT SURGERY
Discharge: HOME | End: 2025-02-13
Attending: ORTHOPAEDIC SURGERY | Admitting: ORTHOPAEDIC SURGERY
Payer: MEDICARE

## 2025-02-13 VITALS
HEART RATE: 77 BPM | OXYGEN SATURATION: 97 % | HEIGHT: 66 IN | DIASTOLIC BLOOD PRESSURE: 70 MMHG | TEMPERATURE: 97.2 F | RESPIRATION RATE: 16 BRPM | BODY MASS INDEX: 31.18 KG/M2 | SYSTOLIC BLOOD PRESSURE: 166 MMHG | WEIGHT: 194 LBS

## 2025-02-13 DIAGNOSIS — M67.431 GANGLION OF RIGHT WRIST: Primary | ICD-10-CM

## 2025-02-13 DIAGNOSIS — M67.432 GANGLION OF LEFT WRIST: ICD-10-CM

## 2025-02-13 PROBLEM — I10 HTN (HYPERTENSION): Status: ACTIVE | Noted: 2025-02-13

## 2025-02-13 PROCEDURE — 2500000005 HC RX 250 GENERAL PHARMACY W/O HCPCS: Performed by: ORTHOPAEDIC SURGERY

## 2025-02-13 PROCEDURE — 25111 REMOVE WRIST TENDON LESION: CPT | Performed by: ORTHOPAEDIC SURGERY

## 2025-02-13 PROCEDURE — 3600000007 HC OR TIME - EACH INCREMENTAL 1 MINUTE - PROCEDURE LEVEL TWO: Performed by: ORTHOPAEDIC SURGERY

## 2025-02-13 PROCEDURE — 3600000002 HC OR TIME - INITIAL BASE CHARGE - PROCEDURE LEVEL TWO: Performed by: ORTHOPAEDIC SURGERY

## 2025-02-13 PROCEDURE — 2500000004 HC RX 250 GENERAL PHARMACY W/ HCPCS (ALT 636 FOR OP/ED): Performed by: ORTHOPAEDIC SURGERY

## 2025-02-13 PROCEDURE — 3700000001 HC GENERAL ANESTHESIA TIME - INITIAL BASE CHARGE: Performed by: ORTHOPAEDIC SURGERY

## 2025-02-13 PROCEDURE — 7100000009 HC PHASE TWO TIME - INITIAL BASE CHARGE: Performed by: ORTHOPAEDIC SURGERY

## 2025-02-13 PROCEDURE — 2720000007 HC OR 272 NO HCPCS: Performed by: ORTHOPAEDIC SURGERY

## 2025-02-13 PROCEDURE — 2500000004 HC RX 250 GENERAL PHARMACY W/ HCPCS (ALT 636 FOR OP/ED): Performed by: ANESTHESIOLOGIST ASSISTANT

## 2025-02-13 PROCEDURE — 3700000002 HC GENERAL ANESTHESIA TIME - EACH INCREMENTAL 1 MINUTE: Performed by: ORTHOPAEDIC SURGERY

## 2025-02-13 PROCEDURE — 7100000010 HC PHASE TWO TIME - EACH INCREMENTAL 1 MINUTE: Performed by: ORTHOPAEDIC SURGERY

## 2025-02-13 RX ORDER — CHLORHEXIDINE GLUCONATE 40 MG/ML
SOLUTION TOPICAL AS NEEDED
Status: DISCONTINUED | OUTPATIENT
Start: 2025-02-13 | End: 2025-02-13 | Stop reason: HOSPADM

## 2025-02-13 RX ORDER — OXYCODONE HYDROCHLORIDE 5 MG/1
5 TABLET ORAL EVERY 4 HOURS PRN
Status: DISCONTINUED | OUTPATIENT
Start: 2025-02-13 | End: 2025-02-13 | Stop reason: HOSPADM

## 2025-02-13 RX ORDER — METOCLOPRAMIDE HYDROCHLORIDE 5 MG/ML
10 INJECTION INTRAMUSCULAR; INTRAVENOUS ONCE AS NEEDED
Status: DISCONTINUED | OUTPATIENT
Start: 2025-02-13 | End: 2025-02-13 | Stop reason: HOSPADM

## 2025-02-13 RX ORDER — FENTANYL CITRATE 50 UG/ML
INJECTION, SOLUTION INTRAMUSCULAR; INTRAVENOUS AS NEEDED
Status: DISCONTINUED | OUTPATIENT
Start: 2025-02-13 | End: 2025-02-13

## 2025-02-13 RX ORDER — ONDANSETRON HYDROCHLORIDE 2 MG/ML
INJECTION, SOLUTION INTRAVENOUS AS NEEDED
Status: DISCONTINUED | OUTPATIENT
Start: 2025-02-13 | End: 2025-02-13

## 2025-02-13 RX ORDER — SODIUM CHLORIDE 0.9 G/100ML
INJECTION, SOLUTION IRRIGATION AS NEEDED
Status: DISCONTINUED | OUTPATIENT
Start: 2025-02-13 | End: 2025-02-13 | Stop reason: HOSPADM

## 2025-02-13 RX ORDER — CEFAZOLIN SODIUM 2 G/50ML
2 SOLUTION INTRAVENOUS ONCE
Status: DISCONTINUED | OUTPATIENT
Start: 2025-02-13 | End: 2025-02-13 | Stop reason: HOSPADM

## 2025-02-13 RX ORDER — PROPOFOL 10 MG/ML
INJECTION, EMULSION INTRAVENOUS CONTINUOUS PRN
Status: DISCONTINUED | OUTPATIENT
Start: 2025-02-13 | End: 2025-02-13

## 2025-02-13 RX ORDER — LABETALOL HYDROCHLORIDE 5 MG/ML
5 INJECTION, SOLUTION INTRAVENOUS ONCE AS NEEDED
Status: DISCONTINUED | OUTPATIENT
Start: 2025-02-13 | End: 2025-02-13 | Stop reason: HOSPADM

## 2025-02-13 RX ORDER — HYDROCODONE BITARTRATE AND ACETAMINOPHEN 5; 325 MG/1; MG/1
1 TABLET ORAL EVERY 6 HOURS PRN
Qty: 14 TABLET | Refills: 0 | Status: SHIPPED | OUTPATIENT
Start: 2025-02-13

## 2025-02-13 RX ORDER — CEFAZOLIN 1 G/1
INJECTION, POWDER, FOR SOLUTION INTRAVENOUS AS NEEDED
Status: DISCONTINUED | OUTPATIENT
Start: 2025-02-13 | End: 2025-02-13

## 2025-02-13 RX ORDER — MIDAZOLAM HYDROCHLORIDE 1 MG/ML
INJECTION, SOLUTION INTRAMUSCULAR; INTRAVENOUS AS NEEDED
Status: DISCONTINUED | OUTPATIENT
Start: 2025-02-13 | End: 2025-02-13

## 2025-02-13 RX ORDER — ACETAMINOPHEN 325 MG/1
650 TABLET ORAL EVERY 4 HOURS PRN
Status: DISCONTINUED | OUTPATIENT
Start: 2025-02-13 | End: 2025-02-13 | Stop reason: HOSPADM

## 2025-02-13 RX ORDER — LIDOCAINE HYDROCHLORIDE 10 MG/ML
0.1 INJECTION, SOLUTION EPIDURAL; INFILTRATION; INTRACAUDAL; PERINEURAL ONCE
Status: DISCONTINUED | OUTPATIENT
Start: 2025-02-13 | End: 2025-02-13 | Stop reason: HOSPADM

## 2025-02-13 RX ORDER — ALBUTEROL SULFATE 0.83 MG/ML
2.5 SOLUTION RESPIRATORY (INHALATION) ONCE AS NEEDED
Status: DISCONTINUED | OUTPATIENT
Start: 2025-02-13 | End: 2025-02-13 | Stop reason: HOSPADM

## 2025-02-13 RX ORDER — FENTANYL CITRATE 50 UG/ML
25 INJECTION, SOLUTION INTRAMUSCULAR; INTRAVENOUS EVERY 5 MIN PRN
Status: DISCONTINUED | OUTPATIENT
Start: 2025-02-13 | End: 2025-02-13 | Stop reason: HOSPADM

## 2025-02-13 RX ORDER — ONDANSETRON HYDROCHLORIDE 2 MG/ML
4 INJECTION, SOLUTION INTRAVENOUS ONCE AS NEEDED
Status: DISCONTINUED | OUTPATIENT
Start: 2025-02-13 | End: 2025-02-13 | Stop reason: HOSPADM

## 2025-02-13 RX ORDER — FENTANYL CITRATE 50 UG/ML
50 INJECTION, SOLUTION INTRAMUSCULAR; INTRAVENOUS EVERY 5 MIN PRN
Status: DISCONTINUED | OUTPATIENT
Start: 2025-02-13 | End: 2025-02-13 | Stop reason: HOSPADM

## 2025-02-13 RX ADMIN — MIDAZOLAM 2 MG: 1 INJECTION INTRAMUSCULAR; INTRAVENOUS at 07:31

## 2025-02-13 RX ADMIN — PROPOFOL 140 MCG/KG/MIN: 10 INJECTION, EMULSION INTRAVENOUS at 07:36

## 2025-02-13 RX ADMIN — FENTANYL CITRATE 100 MCG: 0.05 INJECTION, SOLUTION INTRAMUSCULAR; INTRAVENOUS at 07:33

## 2025-02-13 RX ADMIN — CEFAZOLIN 2 G: 1 INJECTION, POWDER, FOR SOLUTION INTRAMUSCULAR; INTRAVENOUS at 07:30

## 2025-02-13 RX ADMIN — ONDANSETRON 4 MG: 2 INJECTION INTRAMUSCULAR; INTRAVENOUS at 07:43

## 2025-02-13 SDOH — HEALTH STABILITY: MENTAL HEALTH: CURRENT SMOKER: 0

## 2025-02-13 ASSESSMENT — PAIN SCALES - GENERAL
PAINLEVEL_OUTOF10: 0 - NO PAIN
PAINLEVEL_OUTOF10: 0 - NO PAIN
PAIN_LEVEL: 0
PAINLEVEL_OUTOF10: 0 - NO PAIN

## 2025-02-13 ASSESSMENT — COLUMBIA-SUICIDE SEVERITY RATING SCALE - C-SSRS
6. HAVE YOU EVER DONE ANYTHING, STARTED TO DO ANYTHING, OR PREPARED TO DO ANYTHING TO END YOUR LIFE?: NO
1. IN THE PAST MONTH, HAVE YOU WISHED YOU WERE DEAD OR WISHED YOU COULD GO TO SLEEP AND NOT WAKE UP?: NO
2. HAVE YOU ACTUALLY HAD ANY THOUGHTS OF KILLING YOURSELF?: NO

## 2025-02-13 ASSESSMENT — PAIN - FUNCTIONAL ASSESSMENT
PAIN_FUNCTIONAL_ASSESSMENT: 0-10

## 2025-02-13 NOTE — ANESTHESIA PREPROCEDURE EVALUATION
Patient: Wing Fontanez    Procedure Information       Date/Time: 02/13/25 0730    Procedure: Excision Bilateral Radial Wrist Ganglions (Bilateral: Wrist)    Location: [facility] OR 02 / [facility]    Surgeons: Pineda Cage MD            Relevant Problems   Anesthesia (within normal limits)      Cardiac   (+) HTN (hypertension)      Pulmonary (within normal limits)      Neuro (within normal limits)      GI (within normal limits)      /Renal (within normal limits)      Endocrine (within normal limits)      Hematology (within normal limits)      Musculoskeletal (within normal limits)      HEENT (within normal limits)       Clinical information reviewed:   Tobacco  Allergies  Meds   Med Hx  Surg Hx   Fam Hx  Soc Hx        NPO Detail:  NPO/Void Status  Date of Last Liquid: 02/12/25  Time of Last Liquid: 2200  Date of Last Solid: 02/12/25  Time of Last Solid: 2200  Last Intake Type: Clear fluids; Food         Physical Exam    Airway  Mallampati: I  TM distance: >3 FB  Neck ROM: full     Cardiovascular - normal exam     Dental - normal exam     Pulmonary - normal exam     Abdominal - normal exam         Anesthesia Plan    History of general anesthesia?: yes  History of complications of general anesthesia?: no    ASA 2     MAC     The patient is not a current smoker.    intravenous induction   Postoperative administration of opioids is intended.  Anesthetic plan and risks discussed with patient.  Use of blood products discussed with patient who.    Plan discussed with CRNA and CAA.

## 2025-02-13 NOTE — ANESTHESIA POSTPROCEDURE EVALUATION
Patient: Wing Fontanez    Procedure Summary       Date: 02/13/25 Room / Location: [facility] OR 02 / [facility]    Anesthesia Start: 0727 Anesthesia Stop: 0913    Procedure: Excision Bilateral Radial Wrist Ganglions (Bilateral: Wrist) Diagnosis:       Ganglion of right wrist      Ganglion of left wrist      (Ganglion of right wrist [M67.431])      (Ganglion of left wrist [M67.432])    Surgeons: Pineda Cage MD Responsible Provider: Amrit Macedo MD    Anesthesia Type: MAC ASA Status: 2            Anesthesia Type: MAC    Vitals Value Taken Time   /59 02/13/25 0908   Temp 36.2 °C (97.2 °F) 02/13/25 0908   Pulse 53 02/13/25 0908   Resp 16 02/13/25 0908   SpO2 98 % 02/13/25 0908       Anesthesia Post Evaluation    Patient location during evaluation: PACU  Patient participation: complete - patient participated  Level of consciousness: awake  Pain score: 0  Pain management: adequate  Airway patency: patent  Cardiovascular status: acceptable  Respiratory status: acceptable  Hydration status: acceptable  Postoperative Nausea and Vomiting: none        There were no known notable events for this encounter.

## 2025-02-13 NOTE — OP NOTE
ORTHOPEDIC OPERATIVE NOTE    Name:     Wing Fontanez  :     1975  Facility:    [facility] [place]  Date of Surgery:   2025     PREOP DX:          1.  Right volar wrist ganglion       2.  Left dorsal wrist ganglion    POSTOP DX:       Same    PROCEDURE:     1.  Excision of right volar wrist ganglion     2.  Excision left dorsal wrist ganglion    SURGEON: JR Niles MD    RESIDENT/FELLOW/ASSISTANT:  None    ANESTHESIA:   MAC sedation plus local    ESTIMATED BLOOD LOSS :   5 ml    SPECIMEN:   None    IMPLANTS:    None    TOURNIQUET TIME:    30 minutes, right, at 250 mmHg  ;  16 minutes left, Esmarch    COMPLICATIONS:  None    PATIENT RETURNED TO/CONDITION:  PACU in Good      INDICATIONS:      Wing Fontanez is a 49 y.o., right-hand-dominant male who presents with masses at the right volar radial wrist and left dorsal radial wrist.  Both cause discomfort with full wrist extension and axial load, such as pressing up from a chair.  Both fluctuate in size.  In order to maximize return to function, he is here for elective excision of both of these presumed ganglion cysts.  I reminded him of surgical risks of infection, scarring, damage to nerves, tendons, or vessels, stiffness, wound healing problems, failure to relieve symptoms, recurrent symptoms, and recurrent ganglion.  He voiced understanding and wished to proceed with all portions.      NARRATIVE:       Following identification of patient and confirmation of correct sites of surgery and signed operative consent, he was brought to the operating room and a hand table affixed to the right side of the cart.  A light MAC sedation was administered by Anesthesia, along with IV antibiotic dose.  A pneumatic tourniquet was placed high on the right arm, and the limb was prepped from fingertip to cuff with chlorhexidine, and draped free in the usual sterile fashion.  5 cc of a mix of half percent Marcaine and 1% lidocaine plain was instilled to the  planned incision area on the right.  The limb was exsanguinated with an Esmarch, and the tourniquet inflated.    A 2 centimeter transverse incision was made just distal to the 15 mm round mass and taken carefully bluntly down under high loupe magnification.  The mass was encountered in the subcutaneous tissue and was a thin-walled cyst containing clear fluid, consistent with a ganglion.  It was very carefully bluntly dissected away from surrounding structures including the radial artery, which was carefully preserved.  The stalk was followed down to the volar radioscaphoid joint capsule where it was excised along with a small cuff of normal capsule, taking great care to protect the adjacent RSC ligament.  The tourniquet was deflated, and pink color rapidly returned to all nailbeds.  Meticulous hemostasis was achieved with bipolar.  After final irrigation, skin was closed with 4-0 Vicryl subcutaneous and 4-0 Monocryl running subcuticular stitch.  Steri-Strips, Xeroform, and soft dressing were applied, followed by a plaster volar short arm splint.    The back table and scrub tech were carefully kept sterile as the hand table was switched to the left side.  A plastic barrier drape was placed just below the antecubital IV on the left, and the limb was prepped from fingertip to drape with chlorhexidine, and draped free in the usual sterile fashion.  6 cc of a mix of half percent Marcaine and 1% lidocaine plain was instilled to the planned incision area.  The limb was exsanguinated with an Esmarch, which was left in place as a tourniquet.    A 15 mm longitudinal incision was made just dorsal to the first dorsal compartment and was carefully taken down under high loupe magnification.  One crossing branch of the radial sensory nerve was mobilized and protected throughout the case.  The mass on this side was an obvious ganglion as well.  It was bluntly dissected away from the dorsal aspect of the first dorsal compartment.   There was a stalk distally that went down to the CMC joint.  This was divided, and a small cuff of normal capsule excised.  An intracapsular portion of ganglion was decompressed as well.  The Esmarch was then removed, and pink color rapidly returned to all nailbeds.  Meticulous hemostasis was achieved with bipolar.  After final irrigation, a similar skin closure was performed, followed by Steri-Strips, Xeroform, and bulky soft dressing.    The patient was then awakened and transferred to Recovery in stable condition.          Electronically signed  ZOË Cage MD  [phone]

## 2025-02-13 NOTE — DISCHARGE INSTRUCTIONS
Follow-Up Instructions    You will need to be seen in clinic in 10-15 days for a post-operative evaluation.  This appointment will be in the outpatient office, not at the Surgery Center.    You will need to call Supriya in my office and schedule an appointment, unless there is a previous appointment that appears on your discharge instructions.  Her phone number is [phone].  Please do not delay in calling to make this appointment.      Activity Restrictions    1)  No driving for 24 hours after surgery, due to the anesthetic.    2)  No driving or operating heavy machinery while taking narcotic pain medication.    3)  Weight bearing no more than 10 pounds in the splint.  Light use of the fingers (writing, typing, texting) is good to do.     Discharge Medications    A prescription for a narcotic pain medication (Norco) has been sent to your pharmacy of record.  I do not expect you will need this, but wanted you to have it available as an option if over-the-counter medications are not adequately controlling your pain.  Most people simply take Tylenol, Motrin, Advil, or other anti-inflammatory for the pain.  If you do end up taking the prescription medication, please try to wean yourself off this as quickly as possible.    You can add the prescription medication to the anti-inflammatories if needed, but should not add it to Tylenol, as there is already Tylenol in the prescription.    Wound care instructions:     1)  Leave operative dressing with splint in place until you are seen in the office.  If you shower, cover the hands with a plastic bag and elevate it so the water cannot run down into the bag. OK to remove the dressing without the splint (left side) after one week.    2)  Call if any drainage after 7 days, increased redness/warmth/swelling at incision site, abnormal pain/tenderness of the extremity, abnormal swelling of the extremity that does not respond to elevation, shortness of breath, or chest pain.

## 2025-02-13 NOTE — BRIEF OP NOTE
Date: 2025  OR Location: [facility]    Name: Wing Fontanez, : 1975, Age: 49 y.o., MRN: [id number], Sex: male    Diagnosis  Pre-op Diagnosis      * Ganglion of right wrist [M67.431]     * Ganglion of left wrist [M67.432] Post-op Diagnosis     * Ganglion of right wrist [M67.431]     * Ganglion of left wrist [M67.432]     Procedures  Excision Bilateral Radial Wrist Ganglions  02441 - NC EXCISION GANGLION WRIST DORSAL/VOLAR PRIMARY      Surgeons      * Pineda Cage - Primary    Resident/Fellow/Other Assistant:  Surgeons and Role:  * No surgeons found with a matching role *    Staff:   Circulator: Kiera  Scrub Person: Gael Hinkle Person: Renetta  Circulator: Eve    Anesthesia Staff: Anesthesiologist: Amrit Macedo MD  C-AA: DEBBIE Talbert    Procedure Summary  Anesthesia: Monitor Anesthesia Care  ASA: II  Estimated Blood Loss: 5mL  Intra-op Medications:   Administrations occurring from 0730 to 0930 on 25:   Medication Name Total Dose   sodium chloride 0.9 % irrigation solution 150 mL   chlorhexidine (Hibiclens) 4 % liquid 2 Application   BUPivacaine HCl (Marcaine) 0.5 % (5 mg/mL) 5 mL, lidocaine (Xylocaine) 5 mL syringe 11 mL   ceFAZolin (Ancef) 1 g 2 g   fentaNYL PF 0.05 mg/mL 100 mcg   midazolam (Versed) 1 mg/1 mL 2 mg   ondansetron (Zofran) 2 mg/mL injection 4 mg   propofol (Diprivan) infusion 10 mg/mL 1,209.12 mg              Anesthesia Record               Intraprocedure I/O Totals          Intake    Propofol Drip 0.00 mL    The total shown is the total volume documented since Anesthesia Start was filed.    Total Intake 0 mL          Specimen: No specimens collected               Findings: bilateral ganglions    Complications:  None; patient tolerated the procedure well.     Disposition: PACU - hemodynamically stable.  Condition: stable  Specimens Collected: No specimens collected  Attending Attestation: I performed the procedure.    Pineda Cage  Phone Number:  [phone]

## 2025-02-18 ENCOUNTER — TELEPHONE (OUTPATIENT)
Dept: PRIMARY CARE | Facility: HOSPITAL | Age: 50
End: 2025-02-18
Payer: MEDICARE

## 2025-02-18 DIAGNOSIS — M10.00 IDIOPATHIC GOUT, UNSPECIFIED CHRONICITY, UNSPECIFIED SITE: ICD-10-CM

## 2025-02-20 RX ORDER — ALLOPURINOL 100 MG/1
200 TABLET ORAL DAILY
Qty: 180 TABLET | Refills: 3 | Status: SHIPPED | OUTPATIENT
Start: 2025-02-20 | End: 2026-02-20

## 2025-02-20 NOTE — TELEPHONE ENCOUNTER
Received box message that patient had a question about his allopurinol. Called yareli and he stated his last refill was only for 30 days when he usually gets longer. Rx sent for 90 days + 3 refills to preferred pharmacy,

## 2025-08-06 ENCOUNTER — TELEPHONE (OUTPATIENT)
Dept: INTERNAL MEDICINE | Facility: HOSPITAL | Age: 50
End: 2025-08-06
Payer: MEDICARE

## 2025-08-06 ENCOUNTER — TELEPHONE (OUTPATIENT)
Dept: PRIMARY CARE | Facility: HOSPITAL | Age: 50
End: 2025-08-06

## 2025-08-06 DIAGNOSIS — I15.8 OTHER SECONDARY HYPERTENSION: ICD-10-CM

## 2025-08-06 DIAGNOSIS — M10.00 IDIOPATHIC GOUT, UNSPECIFIED CHRONICITY, UNSPECIFIED SITE: ICD-10-CM

## 2025-08-06 DIAGNOSIS — E78.2 MIXED HYPERLIPIDEMIA: ICD-10-CM

## 2025-08-06 RX ORDER — ALLOPURINOL 100 MG/1
200 TABLET ORAL DAILY
Qty: 180 TABLET | Refills: 3 | Status: SHIPPED | OUTPATIENT
Start: 2025-08-06 | End: 2026-08-06

## 2025-08-06 RX ORDER — ATENOLOL 50 MG/1
50 TABLET ORAL 2 TIMES DAILY
Qty: 60 TABLET | Refills: 11 | Status: SHIPPED | OUTPATIENT
Start: 2025-08-06 | End: 2026-08-01

## 2025-08-06 RX ORDER — ATORVASTATIN CALCIUM 80 MG/1
80 TABLET, FILM COATED ORAL NIGHTLY
Qty: 30 TABLET | Refills: 11 | Status: SHIPPED | OUTPATIENT
Start: 2025-08-06 | End: 2026-08-01

## 2025-08-06 RX ORDER — AMLODIPINE BESYLATE 10 MG/1
10 TABLET ORAL DAILY
Qty: 30 TABLET | Refills: 11 | Status: SHIPPED | OUTPATIENT
Start: 2025-08-06 | End: 2026-08-01

## 2025-08-11 ENCOUNTER — OFFICE VISIT (OUTPATIENT)
Dept: PRIMARY CARE | Facility: HOSPITAL | Age: 50
End: 2025-08-11
Payer: MEDICARE

## 2025-08-11 VITALS
BODY MASS INDEX: 31.77 KG/M2 | DIASTOLIC BLOOD PRESSURE: 72 MMHG | HEART RATE: 58 BPM | TEMPERATURE: 98.3 F | HEIGHT: 66 IN | SYSTOLIC BLOOD PRESSURE: 130 MMHG | WEIGHT: 197.7 LBS | OXYGEN SATURATION: 96 %

## 2025-08-11 DIAGNOSIS — G47.33 SEVERE OBSTRUCTIVE SLEEP APNEA: ICD-10-CM

## 2025-08-11 DIAGNOSIS — E66.9 OBESITY (BMI 30-39.9): ICD-10-CM

## 2025-08-11 DIAGNOSIS — R73.03 PREDIABETES: ICD-10-CM

## 2025-08-11 DIAGNOSIS — E78.2 MIXED HYPERLIPIDEMIA: Primary | ICD-10-CM

## 2025-08-11 DIAGNOSIS — Z72.0 TOBACCO USE: ICD-10-CM

## 2025-08-11 PROCEDURE — 99212 OFFICE O/P EST SF 10 MIN: CPT

## 2025-08-11 PROCEDURE — RXMED WILLOW AMBULATORY MEDICATION CHARGE

## 2025-08-11 RX ORDER — TIRZEPATIDE 2.5 MG/.5ML
2.5 INJECTION, SOLUTION SUBCUTANEOUS
Qty: 4 EACH | Refills: 0 | Status: SHIPPED | OUTPATIENT
Start: 2025-08-11

## 2025-08-11 RX ORDER — VARENICLINE TARTRATE 0.5 MG/1
0.5 TABLET, FILM COATED ORAL 2 TIMES DAILY
Qty: 11 TABLET | Refills: 0 | Status: SHIPPED | OUTPATIENT
Start: 2025-08-11 | End: 2025-11-09

## 2025-08-11 RX ORDER — VARENICLINE TARTRATE 1 MG/1
1 TABLET, FILM COATED ORAL 2 TIMES DAILY
Qty: 56 TABLET | Refills: 2 | Status: SHIPPED | OUTPATIENT
Start: 2025-08-11 | End: 2025-11-09

## 2025-08-11 ASSESSMENT — ENCOUNTER SYMPTOMS
OCCASIONAL FEELINGS OF UNSTEADINESS: 0
LOSS OF SENSATION IN FEET: 0
DEPRESSION: 0

## 2025-08-11 ASSESSMENT — PAIN SCALES - GENERAL: PAINLEVEL_OUTOF10: 0-NO PAIN

## 2025-08-11 ASSESSMENT — PATIENT HEALTH QUESTIONNAIRE - PHQ9
SUM OF ALL RESPONSES TO PHQ9 QUESTIONS 1 AND 2: 0
2. FEELING DOWN, DEPRESSED OR HOPELESS: NOT AT ALL
1. LITTLE INTEREST OR PLEASURE IN DOING THINGS: NOT AT ALL

## 2025-08-12 ENCOUNTER — PHARMACY VISIT (OUTPATIENT)
Dept: PHARMACY | Facility: CLINIC | Age: 50
End: 2025-08-12
Payer: COMMERCIAL

## 2025-08-22 LAB
ALBUMIN SERPL-MCNC: 5.2 G/DL (ref 3.6–5.1)
ALBUMIN/CREAT UR: 10 MG/G CREAT
BUN SERPL-MCNC: 11 MG/DL (ref 7–25)
BUN/CREAT SERPL: ABNORMAL (CALC) (ref 6–22)
CALCIUM SERPL-MCNC: 9.9 MG/DL (ref 8.6–10.3)
CHLORIDE SERPL-SCNC: 103 MMOL/L (ref 98–110)
CHOLEST SERPL-MCNC: 125 MG/DL
CHOLEST/HDLC SERPL: 2.3 (CALC)
CO2 SERPL-SCNC: 26 MMOL/L (ref 20–32)
CREAT SERPL-MCNC: 1.1 MG/DL (ref 0.6–1.29)
CREAT UR-MCNC: 97 MG/DL (ref 20–320)
EGFRCR SERPLBLD CKD-EPI 2021: 82 ML/MIN/1.73M2
EST. AVERAGE GLUCOSE BLD GHB EST-MCNC: 123 MG/DL
EST. AVERAGE GLUCOSE BLD GHB EST-SCNC: 6.8 MMOL/L
GLUCOSE SERPL-MCNC: 88 MG/DL (ref 65–99)
HBA1C MFR BLD: 5.9 %
HDLC SERPL-MCNC: 55 MG/DL
LDLC SERPL CALC-MCNC: 51 MG/DL (CALC)
MICROALBUMIN UR-MCNC: 1 MG/DL
NONHDLC SERPL-MCNC: 70 MG/DL (CALC)
PHOSPHATE SERPL-MCNC: 3.6 MG/DL (ref 2.5–4.5)
POTASSIUM SERPL-SCNC: 4 MMOL/L (ref 3.5–5.3)
SODIUM SERPL-SCNC: 139 MMOL/L (ref 135–146)
TRIGL SERPL-MCNC: 111 MG/DL

## 2025-08-26 ENCOUNTER — RESULTS FOLLOW-UP (OUTPATIENT)
Dept: PRIMARY CARE | Facility: HOSPITAL | Age: 50
End: 2025-08-26
Payer: MEDICARE

## 2025-08-26 DIAGNOSIS — G47.33 SEVERE OBSTRUCTIVE SLEEP APNEA: ICD-10-CM

## 2025-08-26 RX ORDER — TIRZEPATIDE 2.5 MG/.5ML
2.5 INJECTION, SOLUTION SUBCUTANEOUS
Qty: 4 EACH | Refills: 0 | Status: SHIPPED | OUTPATIENT
Start: 2025-08-26

## 2025-08-27 DIAGNOSIS — G47.33 OSA (OBSTRUCTIVE SLEEP APNEA): ICD-10-CM

## 2025-11-04 ENCOUNTER — APPOINTMENT (OUTPATIENT)
Dept: SLEEP MEDICINE | Facility: CLINIC | Age: 50
End: 2025-11-04
Payer: MEDICARE

## 2025-11-21 ENCOUNTER — APPOINTMENT (OUTPATIENT)
Dept: SLEEP MEDICINE | Facility: CLINIC | Age: 50
End: 2025-11-21
Payer: MEDICARE

## (undated) DEVICE — DRESSING, GAUZE, SPONGE, 12 PLY, 4 X 4 IN, PLASTIC POUCH, STRL 10PK

## (undated) DEVICE — GLOVE, SURGICAL, PROTEXIS PI , 7.0, PF, LF

## (undated) DEVICE — SUTURE, MONOCRYLIC, 4-0, P3, MONO 18

## (undated) DEVICE — SUTURE, VICRYL, 4-0, 18 IN, P-3, UNDYED

## (undated) DEVICE — STOCKINETTE, DOUBLE PLY, 4 X 48 IN, STERILE

## (undated) DEVICE — TOWEL, SURGICAL, NEURO, O/R, 16 X 26, BLUE, STERILE

## (undated) DEVICE — DRESSING, GAUZE, PETROLATUM, PATCH, XEROFORM, 1 X 8 IN, STERILE

## (undated) DEVICE — STRIP, SKIN CLOSURE, STERI STRIP, REINFORCED, 0.5 X 4 IN

## (undated) DEVICE — GLOVE, SURGICAL, PROTEXIS PI BLUE W/NEUTHERA, 7.5, PF, LF

## (undated) DEVICE — BANDAGE, PLASTER, FAST SETTING, 4 IN X 5 YD

## (undated) DEVICE — Device

## (undated) DEVICE — PADDING, UNDERCAST, WEBRIL, 4 IN X 4 YD, REG, NS

## (undated) DEVICE — BANDAGE, ELASTIC, MATRIX, SELF-CLOSURE, 3 IN X 5 YD, LF

## (undated) DEVICE — FORCEP, BIOPOLAR, ADSON, NON INSUL, 5IN 1.0MM

## (undated) DEVICE — BANDAGE, PLASTER, FAST SETTING, 6 IN X 5 YD